# Patient Record
Sex: MALE | Race: WHITE | Employment: FULL TIME | URBAN - NONMETROPOLITAN AREA
[De-identification: names, ages, dates, MRNs, and addresses within clinical notes are randomized per-mention and may not be internally consistent; named-entity substitution may affect disease eponyms.]

---

## 2017-01-13 ENCOUNTER — HISTORY (OUTPATIENT)
Dept: FAMILY MEDICINE | Facility: OTHER | Age: 37
End: 2017-01-13

## 2017-01-13 ENCOUNTER — OFFICE VISIT - GICH (OUTPATIENT)
Dept: FAMILY MEDICINE | Facility: OTHER | Age: 37
End: 2017-01-13

## 2017-01-13 DIAGNOSIS — S61.219A LACERATION OF FINGER WITHOUT FOREIGN BODY WITHOUT DAMAGE TO NAIL: ICD-10-CM

## 2017-02-15 ENCOUNTER — HISTORY (OUTPATIENT)
Dept: FAMILY MEDICINE | Facility: OTHER | Age: 37
End: 2017-02-15

## 2017-02-15 ENCOUNTER — OFFICE VISIT - GICH (OUTPATIENT)
Dept: FAMILY MEDICINE | Facility: OTHER | Age: 37
End: 2017-02-15

## 2017-02-15 DIAGNOSIS — Z79.899 OTHER LONG TERM (CURRENT) DRUG THERAPY: ICD-10-CM

## 2017-02-15 DIAGNOSIS — F90.0 ATTENTION-DEFICIT HYPERACTIVITY DISORDER, PREDOMINANTLY INATTENTIVE TYPE: ICD-10-CM

## 2017-02-22 ENCOUNTER — COMMUNICATION - GICH (OUTPATIENT)
Dept: FAMILY MEDICINE | Facility: OTHER | Age: 37
End: 2017-02-22

## 2017-02-22 DIAGNOSIS — F90.0 ATTENTION-DEFICIT HYPERACTIVITY DISORDER, PREDOMINANTLY INATTENTIVE TYPE: ICD-10-CM

## 2017-02-22 DIAGNOSIS — Z79.899 OTHER LONG TERM (CURRENT) DRUG THERAPY: ICD-10-CM

## 2017-04-19 ENCOUNTER — OFFICE VISIT - GICH (OUTPATIENT)
Dept: FAMILY MEDICINE | Facility: OTHER | Age: 37
End: 2017-04-19

## 2017-04-19 ENCOUNTER — HISTORY (OUTPATIENT)
Dept: FAMILY MEDICINE | Facility: OTHER | Age: 37
End: 2017-04-19

## 2017-04-19 ENCOUNTER — COMMUNICATION - GICH (OUTPATIENT)
Dept: FAMILY MEDICINE | Facility: OTHER | Age: 37
End: 2017-04-19

## 2017-04-19 DIAGNOSIS — M77.12 LATERAL EPICONDYLITIS OF LEFT ELBOW: ICD-10-CM

## 2017-05-02 ENCOUNTER — COMMUNICATION - GICH (OUTPATIENT)
Dept: FAMILY MEDICINE | Facility: OTHER | Age: 37
End: 2017-05-02

## 2018-01-02 NOTE — NURSING NOTE
Patient Information     Patient Name MRN Fernando Servin [id number] Male 1980      Nursing Note by Lore Carreon at 2017 11:15 AM     Author:  Lore Carreon Service:  (none) Author Type:  (none)     Filed:  2017 11:12 AM Encounter Date:  2017 Status:  Signed     :  Lore Carreon            Patient presents to the clinic today for  Cut on left pointer finger that occurred 2017 at 4 am. Patient states cut occurred due to a orbital samantha.  Lore Carreon CMA ([facility])........2017 11:09 AM

## 2018-01-03 NOTE — PATIENT INSTRUCTIONS
Patient Information     Patient Name Fernando Bocanegra [id number] Male 1980      Patient Instructions by Sarai Solano NP at 2017 11:15 AM     Author:  Sarai Solano NP Service:  (none) Author Type:  PHYS- Nurse Practitioner     Filed:  2017 11:17 AM Encounter Date:  2017 Status:  Signed     :  Sarai Solano NP (PHYS- Nurse Practitioner)            Wash finger 1-2 times daily with soap and water.   Apply antibiotic ointment and bandage  Watch for signs of infection such as increased pain, redness or drainage-follow up if this occurs  Tetanus was given today

## 2018-01-03 NOTE — NURSING NOTE
Patient Information     Patient Name MRN Fernando Servin [id number] Male 1980      Nursing Note by Kateryna Long at 2/15/2017  3:00 PM     Author:  Kateryna Long Service:  (none) Author Type:  (none)     Filed:  2/15/2017  3:20 PM Encounter Date:  2/15/2017 Status:  Signed     :  Kateryna Long            Fernando Wong is a 36 y.o. male  presenting today for medication management  Kateryna Long LPN 2/15/2017 3:11 PM

## 2018-01-03 NOTE — TELEPHONE ENCOUNTER
Patient Information     Patient Name MRN Fernando Servin [id number] Male 1980      Telephone Encounter by Eze Lee MD at 2017  5:14 PM     Author:  Eze Lee MD Service:  (none) Author Type:  Physician     Filed:  2017  5:14 PM Encounter Date:  2017 Status:  Signed     :  Eze Lee MD (Physician)            OK changed

## 2018-01-03 NOTE — PROGRESS NOTES
Patient Information     Patient Name MRN Sex Fernando June [id number] Male 1980      Progress Notes by Sarai Solano NP at 2017 11:15 AM     Author:  Sarai Solano NP Service:  (none) Author Type:  PHYS- Nurse Practitioner     Filed:  2017 11:50 AM Encounter Date:  2017 Status:  Signed     :  Sarai Solano NP (PHYS- Nurse Practitioner)            HPI:    Fernando Wong is a 36 y.o. male who presents to clinic today for left index finger laceration. This occurred today at 4 am while using orbital samantha, working on a project at home. He did wash this and bandaged this up. He comes in now with concerns of this maybe having sawdust in wound. Co-workers recommended this be evaluated. Unsure when last had tetanus.     No past medical history on file.  Past Surgical History      Procedure  Laterality Date     [place] teeth extraction       Social History       Substance Use Topics         Smoking status:   Never Smoker     Smokeless tobacco:   Never Used     Alcohol use   Yes      Comment: rarely      Current Outpatient Prescriptions       Medication  Sig Dispense Refill     dextroamphetamine-amphetamine (ADDERALL XR) 10 mg Extended-Release capsule Take 3 capsules by mouth every morning 90 capsule 0     No current facility-administered medications for this visit.      Medications have been reviewed by me and are current to the best of my knowledge and ability.    No Known Allergies    ROS:  Pertinent positives and negatives are noted in HPI.    EXAM:  General appearance: well appearing male, in no acute distress  Dermatological: 1 cm laceration to tip of left index, wound is closed already.   Psychological: normal affect, alert and pleasant    ASSESSMENT/PLAN:    ICD-10-CM    1. Finger laceration, initial encounter S61.219A TDAP VACCINE IM   Finger laceration to left index finger. Discussed sx management with washing with soap and water, abx ointment and bandage 1-2 times daily.  Reviewed s/s infection and when to f/u in clinic. Tetanus was last given in 2008, this was updated today. All questions were answered and he is in agreement with plan.     Patient Instructions   Wash finger 1-2 times daily with soap and water.   Apply antibiotic ointment and bandage  Watch for signs of infection such as increased pain, redness or drainage-follow up if this occurs  Tetanus was given today

## 2018-01-03 NOTE — TELEPHONE ENCOUNTER
Patient Information     Patient Name MRFernando Son [id number] Male 1980      Telephone Encounter by Mariela Peterson at 2017  1:30 PM     Author:  Mariela Peterson Service:  (none) Author Type:  (none)     Filed:  2017  1:31 PM Encounter Date:  2017 Status:  Signed     :  Mariela Peterson            Patient notified and informed he needed to bring in the other 2 Rx that he received for the 20 mg med. He can pick these up when he returns the other 2.  Mariela Peterson LPN   2017  1:30 PM

## 2018-01-03 NOTE — TELEPHONE ENCOUNTER
Patient Information     Patient Name MRFernando Son [id number] Male 1980      Telephone Encounter by Kateryna Long at 2017  4:52 PM     Author:  Kateryna Long Service:  (none) Author Type:  (none)     Filed:  2017  5:03 PM Encounter Date:  2017 Status:  Signed     :  Kateryna Long            Patient states that he would like to just take 10 mg three times a day of the Adderall instead of the 20 mg and the 10 mg daily. He does not want to pay 2 copays. Pharmacy states that he would have to have new scripts for the 10 mg Three times daily.  Kateryna Long LPN 2017 4:53 PM

## 2018-01-03 NOTE — PROGRESS NOTES
Patient Information     Patient Name MRN Fernando Servin [id number] Male 1980      Progress Notes by Eze Lee MD at 2/15/2017  3:00 PM     Author:  Eze Lee MD Service:  (none) Author Type:  Physician     Filed:  2017 10:26 PM Encounter Date:  2/15/2017 Status:  Signed     :  Eze Lee MD (Physician)            SUBJECTIVE:  36 y.o. male presents for ADHD. Placed on Adderall in Nov, but never followed up. Had been on Adderall 30 mg daily through [facility] at [facility] in .  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    He was taking 20 mg daily, but then off since . Tolerated dose, no side effects. Some benefit, but thinks he would like to try 30 mg since it worked before.    REVIEW OF SYSTEMS:    Constitutional: Negative  Respiratory: Negative  Cardiovascular: Negative      Current Outpatient Prescriptions       Medication  Sig Dispense Refill     dextroamphetamine-amphetamine (ADDERALL XR) 10 mg Extended-Release capsule Take 3 capsules by mouth every morning 90 capsule 0     Allergies as of 02/15/2017      (No Known Allergies)       OBJECTIVE:  Visit Vitals       /80     Pulse (!) 48     Wt 102.5 kg (226 lb)     BMI 33.13 kg/m2     General Appearance: Alert. No acute distress  Psychiatric Exam: Alert and oriented, appropriate affect.    ASSESSMENT/PLAN:    ICD-10-CM   1. Attention deficit hyperactivity disorder (ADHD), predominantly inattentive type F90.0     Hx of ADHD proven with testing in grad school. Given Adderall XR 20 mg and 10 mg pills to take 20-30 mg daily. He avoid taking on weekends. Has 3 Rx of each, so has enough for 3 mo on 30 mg daily, more than that if he only needs 20 mg daily.     Completed controlled substance agreement. He is a low risk patient and is not on the Adderall currently, so no UDM obtained.   reviewed and only Rx in Nov from me.    F/U in 3 mo

## 2018-01-03 NOTE — TELEPHONE ENCOUNTER
Patient Information     Patient Name MRN Fernando Servin [id number] Male 1980      Telephone Encounter by Eze Lee MD at 3/7/2017  8:35 AM     Author:  Eze Lee MD Service:  (none) Author Type:  Physician     Filed:  3/7/2017  8:35 AM Encounter Date:  2017 Status:  Signed     :  Eze Lee MD (Physician)            I received all of the returned Rx.

## 2018-01-04 NOTE — TELEPHONE ENCOUNTER
Patient Information     Patient Name MRN Fernando Servin [id number] Male 1980      Telephone Encounter by Gosselin, Norma J at 2017 10:38 AM     Author:  Gosselin, Norma J Service:  (none) Author Type:  (none)     Filed:  2017 10:41 AM Encounter Date:  2017 Status:  Signed     :  Gosselin, Norma J            Faxed orders to [facility] [phone]  Norma J Gosselin LPN....................  2017   10:41 AM

## 2018-01-04 NOTE — PROGRESS NOTES
Patient Information     Patient Name MRN Sex Fernando June [id number] Male 1980      Progress Notes by Levon Francois MD at 2017 11:30 AM     Author:  Levon Francois MD Service:  (none) Author Type:  Physician     Filed:  2017  9:58 AM Encounter Date:  2017 Status:  Signed     :  Levon Francois MD (Physician)            SUBJECTIVE:    Fernando Wong is a 36 y.o. male who presents for elbow pain    HPI Comments: Patient arrives here for elbow pain. He's been lifting weights for the last 7 weeks. He has cut down but reports his left elbow has continued to give him pain and discomfort. No history of trauma. Patient has been using ibuprofen and Tylenol without any relief. Has also been icing it.      No Known Allergies,   Family History       Problem   Relation Age of Onset     Cancer  Maternal Grandfather 50     colon cancer; skin cancer      ,   Current Outpatient Prescriptions on File Prior to Visit       Medication  Sig Dispense Refill     [START ON 2017] dextroamphetamine-amphetamine (ADDERALL XR) 10 mg Extended-Release capsule Take 1 capsule by mouth once daily  Earliest Fill Date: 17 30 capsule 0     dextroamphetamine-amphetamine (ADDERALL XR) 10 mg Extended-Release capsule Take 1 capsule by mouth 3 times daily  Earliest Fill Date: 3/24/17 90 capsule 0     dextroamphetamine-amphetamine (ADDERALL XR) 10 mg Extended-Release capsule Take 1 capsule by mouth 3 times daily 90 capsule 0     No current facility-administered medications on file prior to visit.    ,   Past Surgical History:      Procedure  Laterality Date     [place] TEETH EXTRACTION     ,   Social History       Substance Use Topics         Smoking status:   Never Smoker     Smokeless tobacco:   Never Used     Alcohol use   Yes      Comment: rarely     and   Social History       Substance Use Topics         Smoking status:   Never Smoker     Smokeless tobacco:   Never Used     Alcohol use   Yes       Comment: rarely        REVIEW OF SYSTEMS:  ROS    OBJECTIVE:  /80  Pulse 56  Wt 98.9 kg (218 lb)  BMI 31.96 kg/m2    EXAM:   Physical Exam   Constitutional: He is well-developed, well-nourished, and in no distress.   Musculoskeletal:   Pain along the medial and lateral epicondyle good range of motion to his left elbow       ASSESSMENT/PLAN:    ICD-10-CM    1. Lateral epicondylitis of left elbow M77.12 AMB CONSULT TO PHYSICAL THERAPY        Plan:  Referral to physical therapy. Patient would like to see [facility] physical therapy.

## 2018-01-04 NOTE — TELEPHONE ENCOUNTER
Patient Information     Patient Name MRN Fernando Servin [id number] Male 1980      Telephone Encounter by Mariela Ji at 2017  1:08 PM     Author:  Mariela iJ Service:  (none) Author Type:  (none)     Filed:  2017  1:08 PM Encounter Date:  2017 Status:  Signed     :  Mariela Ji            Order for O.T is ready for signature  Mariela Ji LPN....................2017 1:08 PM

## 2018-01-04 NOTE — NURSING NOTE
Patient Information     Patient Name MRN Fernando Servin [id number] Male 1980      Nursing Note by Kateryna Banegas at 2017 11:30 AM     Author:  Kateryna Banegas Service:  (none) Author Type:  (none)     Filed:  2017 11:54 AM Encounter Date:  2017 Status:  Signed     :  Kateryna Banegas            Patient here for left elbow pain for the past 7 weeks, he hurt it lifting weights. Kateryna Banegas LPN .......................2017  11:51 AM

## 2018-01-19 PROBLEM — Z79.899 CONTROLLED SUBSTANCE AGREEMENT SIGNED: Status: ACTIVE | Noted: 2017-02-15

## 2018-01-19 PROBLEM — M77.12 LATERAL EPICONDYLITIS OF LEFT ELBOW: Status: ACTIVE | Noted: 2017-04-19

## 2018-01-19 RX ORDER — DEXTROAMPHETAMINE SACCHARATE, AMPHETAMINE ASPARTATE MONOHYDRATE, DEXTROAMPHETAMINE SULFATE AND AMPHETAMINE SULFATE 2.5; 2.5; 2.5; 2.5 MG/1; MG/1; MG/1; MG/1
10 CAPSULE, EXTENDED RELEASE ORAL DAILY
COMMUNITY
Start: 2017-04-23 | End: 2018-04-25

## 2018-01-25 VITALS
BODY MASS INDEX: 32.35 KG/M2 | WEIGHT: 218 LBS | DIASTOLIC BLOOD PRESSURE: 80 MMHG | BODY MASS INDEX: 31.21 KG/M2 | WEIGHT: 226 LBS | SYSTOLIC BLOOD PRESSURE: 118 MMHG | HEART RATE: 48 BPM | DIASTOLIC BLOOD PRESSURE: 80 MMHG | SYSTOLIC BLOOD PRESSURE: 132 MMHG | HEART RATE: 56 BPM

## 2018-01-26 VITALS
TEMPERATURE: 97.8 F | HEART RATE: 48 BPM | HEIGHT: 69 IN | SYSTOLIC BLOOD PRESSURE: 134 MMHG | BODY MASS INDEX: 31.28 KG/M2 | WEIGHT: 211.2 LBS | DIASTOLIC BLOOD PRESSURE: 70 MMHG

## 2018-03-27 ENCOUNTER — OFFICE VISIT (OUTPATIENT)
Dept: FAMILY MEDICINE | Facility: OTHER | Age: 38
End: 2018-03-27
Attending: NURSE PRACTITIONER
Payer: COMMERCIAL

## 2018-03-27 VITALS
WEIGHT: 219.25 LBS | TEMPERATURE: 97.1 F | HEART RATE: 62 BPM | DIASTOLIC BLOOD PRESSURE: 80 MMHG | SYSTOLIC BLOOD PRESSURE: 130 MMHG | BODY MASS INDEX: 32.14 KG/M2

## 2018-03-27 DIAGNOSIS — R07.0 THROAT PAIN: ICD-10-CM

## 2018-03-27 DIAGNOSIS — J02.9 VIRAL PHARYNGITIS: Primary | ICD-10-CM

## 2018-03-27 LAB
DEPRECATED S PYO AG THROAT QL EIA: NORMAL
SPECIMEN SOURCE: NORMAL

## 2018-03-27 PROCEDURE — 99213 OFFICE O/P EST LOW 20 MIN: CPT | Performed by: NURSE PRACTITIONER

## 2018-03-27 PROCEDURE — 87880 STREP A ASSAY W/OPTIC: CPT | Performed by: NURSE PRACTITIONER

## 2018-03-27 ASSESSMENT — PAIN SCALES - GENERAL: PAINLEVEL: MODERATE PAIN (4)

## 2018-03-27 NOTE — PROGRESS NOTES
HPI:    Fernando Wong is a 37 year old male who presents to clinic today for sore throat. Has had sore throat for 3-4 days. Has had pain on right side more than left. No fevers over past 2 days. He reports influenza like illness last week. Had body aches, chills. Had fevers up to 101.7. No coughing or sinus congestion. He has been taking mucinex and ibuprofen for sx. Does teach at a college.     No past medical history on file.    Past Surgical History:   Procedure Laterality Date     EXTRACTION(S) DENTAL      No Comments Provided       Current Outpatient Prescriptions   Medication Sig Dispense Refill     amphetamine-dextroamphetamine (ADDERALL XR) 10 MG per 24 hr capsule Take 10 mg by mouth daily         No Known Allergies    ROS:  Pertinent positives and negatives are noted in HPI.    EXAM:  General appearance: well appearing male, in no acute distress  Head: normocephalic, atraumatic  Ears: TM's with cone of light, no erythema, canals clear bilaterally  Eyes: conjunctivae normal  Orophayrnx: moist mucous membranes, tonsils with erythema, no exudates or petechiae, no post nasal drip seen. Hoarse voice  Neck: supple without adenopathy  Respiratory: clear to auscultation bilaterally  Cardiac: RRR with no murmurs  Psychological: normal affect, alert and pleasant  Lab:   Results for orders placed or performed in visit on 03/27/18   Strep, Rapid Screen   Result Value Ref Range    Specimen Description Throat     Rapid Strep A Screen       Negative presumptive for Group A Beta Streptococcus         ASSESSMENT AND PLAN:    1. Viral pharyngitis    2. Throat pain    RST negative. Symptoms likely due to virus. No antibiotic is needed at this time. Symptoms typically worse on days 3-4 and then begin improving each day. If symptoms begin worsening or fail to improve after 10-14 days, return to clinic for reevaluation. Discussed s/s that would warrant emergent f/u. All questions were answered and he is in agreement with plan.            Sarai Solano.Shante................3/27/2018 11:01 AM

## 2018-03-27 NOTE — NURSING NOTE
Patient presents to clinic today for sore throat starting this past Saturday. He states he thinks he recently had influenza.     Renetta Betts LPN...................3/27/2018  11:04 AM

## 2018-03-27 NOTE — MR AVS SNAPSHOT
After Visit Summary   3/27/2018    Fernando Wong    MRN: [id number]           Patient Information     Date Of Birth          1980        Visit Information        Provider Department      3/27/2018 11:00 AM Sarai Solano APRN CNP [facility]        Today's Diagnoses     Viral pharyngitis    -  1    Throat pain          Care Instructions      Self-Care for Sore Throats    Sore throats happen for many reasons, such as colds, allergies, and infections caused by viruses or bacteria. In any case, your throat becomes red and sore. Your goal for self-care is to reduce your discomfort while giving your throat a chance to heal.  Moisten and soothe your throat  Tips include the following:    Try a sip of water first thing after waking up.    Keep your throat moist by drinking 6 or more glasses of clear liquids every day.    Run a cool-air humidifier in your room overnight.    Avoid cigarette smoke.     Suck on throat lozenges, cough drops, hard candy, ice chips, or frozen fruit-juice bars. Use the sugar-free versions if your diet or medical condition requires them.  Gargle to ease irritation  Gargling every hour or 2 can ease irritation. Try gargling with 1 of these solutions:    1/4 teaspoon of salt in 1/2 cup of warm water    An over-the-counter anesthetic gargle  Use medicine for more relief  Over-the-counter medicine can reduce sore throat symptoms. Ask your pharmacist if you have questions about which medicine to use:    Ease pain with anesthetic sprays. Aspirin or an aspirin substitute also helps. Remember, never give aspirin to anyone 18 or younger, or if you are already taking blood thinners.     For sore throats caused by allergies, try antihistamines to block the allergic reaction.    Remember: unless a sore throat is caused by a bacterial infection, antibiotics won t help you.  Prevent future sore throats  Prevention tips include the following:    Stop smoking or reduce  contact with secondhand smoke. Smoke irritates the tender throat lining.    Limit contact with pets and with allergy-causing substances, such as pollen and mold.    When you re around someone with a sore throat or cold, wash your hands often to keep viruses or bacteria from spreading.    Don t strain your vocal cords.  Call your healthcare provider  Contact your healthcare provider if you have:    A temperature over 101 F (38.3 C)    White spots on the throat    Great difficulty swallowing    Trouble breathing    A skin rash    Recent exposure to someone else with strep bacteria    Severe hoarseness and swollen glands in the neck or jaw   Date Last Reviewed: 8/1/2016 2000-2017 [employer]. [address] [place] [address]. All rights reserved. This information is not intended as a substitute for professional medical care. Always follow your healthcare professional's instructions.                Follow-ups after your visit        Who to contact     If you have questions or need follow up information about today's clinic visit or your schedule please contact [facility] directly at [phone].  Normal or non-critical lab and imaging results will be communicated to you by [employer]art, letter or phone within 4 business days after the clinic has received the results. If you do not hear from us within 7 days, please contact the clinic through [employer] or phone. If you have a critical or abnormal lab result, we will notify you by phone as soon as possible.  Submit refill requests through [employer] or call your pharmacy and they will forward the refill request to us. Please allow 3 business days for your refill to be completed.          Additional Information About Your Visit        [employer] Information     [employer] lets you send messages to your doctor, view your test results, renew your prescriptions, schedule appointments and more. To sign up, go to [email or web address] . Click  "on \"Log in\" on the left side of the screen, which will take you to the Welcome page. Then click on \"Sign up Now\" on the right side of the page.     You will be asked to enter the access code listed below, as well as some personal information. Please follow the directions to create your username and password.     Your access code is: [email or web address]  Expires: 2018 11:36 AM     Your access code will  in 90 days. If you need help or a new code, please call your [facility] clinic or [phone].        Care EveryWhere ID     This is your Care EveryWhere ID. This could be used by other organizations to access your [facility] medical records  [id number]        Your Vitals Were     Pulse Temperature BMI (Body Mass Index)             62 97.1  F (36.2  C) (Temporal) 32.14 kg/m2          Blood Pressure from Last 3 Encounters:   18 130/80   17 132/80   02/15/17 118/80    Weight from Last 3 Encounters:   18 219 lb 4 oz (99.5 kg)   17 218 lb (98.9 kg)   02/15/17 226 lb (102.5 kg)              We Performed the Following     Strep, Rapid Screen        Primary Care Provider Fax #    Physician No Ref-Primary [phone]       No address on file        Equal Access to Services     GENNY ENGLISH : Hadii criss Bradley, waaxda luqadaha, qaybta kaalgordon sarabia, austyn bullard . So [facility] [phone].    ATENCIÓN: Si habla español, tiene a manzano disposición servicios gratuitos de asistencia lingüística. Llame al [phone].    We comply with applicable federal civil rights laws and [place] laws. We do not discriminate on the basis of race, color, national origin, age, disability, sex, sexual orientation, or gender identity.            Thank you!     Thank you for choosing [facility]  for your care. Our goal is always to provide you with excellent care. Hearing back from our patients is one way we can continue to improve our services. Please take a " few minutes to complete the written survey that you may receive in the mail after your visit with us. Thank you!             Your Updated Medication List - Protect others around you: Learn how to safely use, store and throw away your medicines at www.disposemymeds.org.          This list is accurate as of 3/27/18 11:36 AM.  Always use your most recent med list.                   Brand Name Dispense Instructions for use Diagnosis    amphetamine-dextroamphetamine 10 MG per 24 hr capsule    ADDERALL XR     Take 10 mg by mouth daily

## 2018-03-27 NOTE — PATIENT INSTRUCTIONS
Self-Care for Sore Throats    Sore throats happen for many reasons, such as colds, allergies, and infections caused by viruses or bacteria. In any case, your throat becomes red and sore. Your goal for self-care is to reduce your discomfort while giving your throat a chance to heal.  Moisten and soothe your throat  Tips include the following:    Try a sip of water first thing after waking up.    Keep your throat moist by drinking 6 or more glasses of clear liquids every day.    Run a cool-air humidifier in your room overnight.    Avoid cigarette smoke.     Suck on throat lozenges, cough drops, hard candy, ice chips, or frozen fruit-juice bars. Use the sugar-free versions if your diet or medical condition requires them.  Gargle to ease irritation  Gargling every hour or 2 can ease irritation. Try gargling with 1 of these solutions:    1/4 teaspoon of salt in 1/2 cup of warm water    An over-the-counter anesthetic gargle  Use medicine for more relief  Over-the-counter medicine can reduce sore throat symptoms. Ask your pharmacist if you have questions about which medicine to use:    Ease pain with anesthetic sprays. Aspirin or an aspirin substitute also helps. Remember, never give aspirin to anyone 18 or younger, or if you are already taking blood thinners.     For sore throats caused by allergies, try antihistamines to block the allergic reaction.    Remember: unless a sore throat is caused by a bacterial infection, antibiotics won t help you.  Prevent future sore throats  Prevention tips include the following:    Stop smoking or reduce contact with secondhand smoke. Smoke irritates the tender throat lining.    Limit contact with pets and with allergy-causing substances, such as pollen and mold.    When you re around someone with a sore throat or cold, wash your hands often to keep viruses or bacteria from spreading.    Don t strain your vocal cords.  Call your healthcare provider  Contact your healthcare provider if  you have:    A temperature over 101 F (38.3 C)    White spots on the throat    Great difficulty swallowing    Trouble breathing    A skin rash    Recent exposure to someone else with strep bacteria    Severe hoarseness and swollen glands in the neck or jaw   Date Last Reviewed: 8/1/2016 2000-2017 [employer]. [address] [place], [place] [address]. All rights reserved. This information is not intended as a substitute for professional medical care. Always follow your healthcare professional's instructions.

## 2018-04-25 ENCOUNTER — OFFICE VISIT (OUTPATIENT)
Dept: FAMILY MEDICINE | Facility: OTHER | Age: 38
End: 2018-04-25
Attending: FAMILY MEDICINE
Payer: COMMERCIAL

## 2018-04-25 VITALS
TEMPERATURE: 98.4 F | WEIGHT: 215.6 LBS | SYSTOLIC BLOOD PRESSURE: 138 MMHG | BODY MASS INDEX: 31.61 KG/M2 | DIASTOLIC BLOOD PRESSURE: 86 MMHG

## 2018-04-25 DIAGNOSIS — J20.9 ACUTE BRONCHITIS WITH SYMPTOMS > 10 DAYS: Primary | ICD-10-CM

## 2018-04-25 PROCEDURE — 99213 OFFICE O/P EST LOW 20 MIN: CPT | Performed by: FAMILY MEDICINE

## 2018-04-25 RX ORDER — AZITHROMYCIN 250 MG/1
TABLET, FILM COATED ORAL
Qty: 6 TABLET | Refills: 0 | Status: SHIPPED | OUTPATIENT
Start: 2018-04-25 | End: 2018-08-10

## 2018-04-25 ASSESSMENT — PAIN SCALES - GENERAL: PAINLEVEL: MILD PAIN (2)

## 2018-04-25 NOTE — PROGRESS NOTES
Nursing Notes:   Katherine Funez CMA  4/25/2018  4:52 PM  Signed  Patient presents for an ongoing cough (non-productive now) and head congestion over the last 7 weeks.  Other symptoms have come and gone including fever, sore throat, productive cough, and body aches.  Ears have felt really plugged over the last couple of days.  OTC cough medications are not helping much, especially with his cough at night.  Pain behind right eye with coughing now.    Katherine Funez [facility] ([facility])................ 4/25/2018 3:52 PM         Subjective:  Fernando Wong is a 37 year old male who presents for URI    She is a 37-year-old white male who comes in with a 7 week history of cough.  Initially nonproductive now becoming more productive.  He states a week ago he started having fevers and thicker nasal discharge.  He has had increasing headache.  Some body aches.  Temp is never been above 100.  He was screened for strep throat a month ago which was negative.  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  He denies any nausea vomiting or diarrhea.  No chest pain no shortness of breath.  He is a non-smoker he did not receive the flu shot this year    He has been using a variety of over-the-counter products including but not limited to Afrin, NyQuil, Benadryl, cough syrup.      No Known Allergies     Current Outpatient Prescriptions   Medication     azithromycin (ZITHROMAX) 250 MG tablet     No current facility-administered medications for this visit.      Patient Active Problem List   Diagnosis     Controlled substance agreement signed     Insomnia     Lateral epicondylitis of left elbow     Recurrent herpes simplex         Social Hx:  Social History   Substance Use Topics     Smoking status: Never Smoker     Smokeless tobacco: Never Used     Alcohol use Yes      Comment: Less than 1 per month       Family Hx:   Family History   Problem Relation Age of Onset      CANCER Maternal Grandfather 50     Cancer,colon cancer; skin cancer       Objective:  /86  Temp 98.4  F (36.9  C) (Tympanic)  Wt 215 lb 9.6 oz (97.8 kg)  BMI 31.61 kg/m2    Patient is alert oriented ×3 no apparent distress PERRLA EOMI is tender with palpation of his right maxillary sinus but not left nor his frontal.  His Postnasal drip thick posterior pharynx.  TM retracted on right otherwise normal left TM is normal.  Lungs clear to auscultation without wheezes rhonchi rales heart sounds S1-S2 in regular rate rhythm abdomen soft nontender skin is warm without rash      Assessment:    ICD-10-CM    1. Acute bronchitis with symptoms > 10 days J20.9 azithromycin (ZITHROMAX) 250 MG tablet         Plan:   -- Expected clinical course discussed   -- Medications and their side effects discussed  Patient Instructions     1.  zithromax is your antibiotic; take as directed  2.  Stop nyquil, stop sudafed it is increasing your blood pressure  3.  Increase fluids , water/ soup/ broth/ gatorade/ milk/ juice  4.  Okay to use cough drops or delsym over the counter as cough syrup if necessary   5.  Have your blood pressure rechecked in 2-3 weeks  6.  Never use afrin nasal spray longer than 3 days     Bronchitis, Antibiotic Treatment (Adult)    Bronchitis is an infection of the air passages (bronchial tubes) in your lungs. It often occurs when you have a cold. This illness is contagious during the first few days and is spread through the air by coughing and sneezing, or by direct contact (touching the sick person and then touching your own eyes, nose, or mouth).  Symptoms of bronchitis include cough with mucus (phlegm) and low-grade fever. Bronchitis usually lasts 7 to 14 days. Mild cases can be treated with simple home remedies. More severe infection is treated with an antibiotic.  Home care  Follow these guidelines when caring for yourself at home:    If your symptoms are severe, rest at home for the first 2 to 3 days.  When you go back to your usual activities, don't let yourself get too tired.    Do not smoke. Also avoid being exposed to secondhand smoke.    You may use over-the-counter medicines to control fever or pain, unless another medicine was prescribed. If you have chronic liver or kidney disease or have ever had a stomach ulcer or gastrointestinal bleeding, talk with your healthcare provider before using these medicines. Also talk to your provider if you are taking medicine to prevent blood clots. Aspirin should never be given to anyone younger than 18 years of age who is ill with a viral infection or fever. It may cause severe liver or brain damage.    Your appetite may be poor, so a light diet is fine. Avoid dehydration by drinking 6 to 8 glasses of fluids per day (such as water, soft drinks, sports drinks, juices, tea, or soup). Extra fluids will help loosen secretions in the nose and lungs.    Over-the-counter cough, cold, and sore-throat medicines will not shorten the length of the illness, but they may be helpful to reduce symptoms. (Note: Do not use decongestants if you have high blood pressure.)    Finish all antibiotic medicine. Do this even if you are feeling better after only a few days.  Follow-up care  Follow up with your healthcare provider, or as advised. If you had an X-ray or ECG (electrocardiogram), a specialist will review it. You will be notified of any new findings that may affect your care.  If you are age 65 or older, or if you have a chronic lung disease or condition that affects your immune system, or you smoke, ask your healthcare provider about getting a pneumococcal vaccine and a yearly flu shot (influenza vaccine).  When to seek medical advice  Call your healthcare provider right away if any of these occur:    Fever of 100.4 F (38 C) or higher, or as directed by your healthcare provider    Coughing up increased amounts of colored sputum    Weakness, drowsiness, headache, facial pain, ear  pain, or a stiff neck  Call 911  Call 911 if any of these occur.    Coughing up blood    Worsening weakness, drowsiness, headache, or stiff neck    Trouble breathing, wheezing, or pain with breathing  Date Last Reviewed: 9/13/2015 2000-2017 [employer]. [address] [place] [id number]. All rights reserved. This information is not intended as a substitute for professional medical care. Always follow your healthcare professional's instructions.        Sinusitis (No Antibiotics)    The sinuses are air-filled spaces within the bones of the face. They connect to the inside of the nose. Sinusitis is an inflammation of the tissue that lines the sinuses. Sinusitis can occur during a cold. It can also happen due to allergies to pollens and other particles in the air. It can cause symptoms such as sinus congestion, headache, sore throat, facial swelling, and a feeling of fullness. It may also cause a low-grade fever. Your sinusitis does not include an infection with bacteria. Because of this, antibiotics are not used to treat this problem.  Home care    Drink plenty of water, hot tea, and other liquids. This may help thin nasal mucus. It also may help your sinuses drain fluids.    Heat may help soothe painful areas of your face. Use a towel soaked in hot water. Or,  the shower and direct the warm spray onto your face. Using a vaporizer along with a menthol rub at night may also help soothe symptoms.     An expectorant with guaifenesin may help thin nasal mucus and help your sinuses drain fluids.    You can use an over-the-counter decongestant, unless a similar medicine was prescribed to you. Nasal sprays work the fastest. Use one that contains phenylephrine or oxymetazoline. First blow your nose gently. Then use the spray. Do not use these medicines more often than directed on the label. If you do, your symptoms may get worse. You may also take pills that contain pseudoephedrine. Don t use  products that combine multiple medicines. This is because side effects may be increased. Read all medicine labels. You can also ask the pharmacist for help. (People with high blood pressure should not use decongestants. They can raise blood pressure.)    Over-the-counter antihistamines may help if allergies contributed to your sinusitis.      Use acetaminophen or ibuprofen to control pain, unless another pain medicine was prescribed to you. If you have chronic liver or kidney disease or ever had a stomach ulcer, talk with your healthcare provider before using these medicines. (Aspirin should never be taken by anyone under age 18 who is ill with a fever. It may cause severe liver damage.)    Use nasal rinses or irrigation as instructed by your healthcare provider.    Don't smoke. This can make symptoms worse.  Follow-up care  Follow up with your healthcare provider or our staff if you are better in 1 week.  When to seek medical advice  Call your healthcare provider if any of these occur:    Green or yellow fluid draining from your nose or into your throat    Facial pain or headache that gets worse    Stiff neck    Unusual drowsiness or confusion    Swelling of your forehead or eyelids    Vision problems, such as blurred or double vision    Fever of 100.4 F (38 C) or higher, or as directed by your healthcare provider    Seizure    Breathing problems    Symptoms that don't go away in 10 days  Date Last Reviewed: 11/1/2017 2000-2017 [employer] [address] [place] [address], [place] [address]. All rights reserved. This information is not intended as a substitute for professional medical care. Always follow your healthcare professional's instructions.            KATHRYN LIU MD

## 2018-04-25 NOTE — NURSING NOTE
Patient presents for an ongoing cough (non-productive now) and head congestion over the last 7 weeks.  Other symptoms have come and gone including fever, sore throat, productive cough, and body aches.  Ears have felt really plugged over the last couple of days.  OTC cough medications are not helping much, especially with his cough at night.  Pain behind right eye with coughing now.    Katherine Funez CMA ([facility])................ 4/25/2018 3:52 PM

## 2018-04-25 NOTE — PATIENT INSTRUCTIONS
1.  zithromax is your antibiotic; take as directed  2.  Stop nyquil, stop sudafed it is increasing your blood pressure  3.  Increase fluids , water/ soup/ broth/ gatorade/ milk/ juice  4.  Okay to use cough drops or delsym over the counter as cough syrup if necessary   5.  Have your blood pressure rechecked in 2-3 weeks  6.  Never use afrin nasal spray longer than 3 days     Bronchitis, Antibiotic Treatment (Adult)    Bronchitis is an infection of the air passages (bronchial tubes) in your lungs. It often occurs when you have a cold. This illness is contagious during the first few days and is spread through the air by coughing and sneezing, or by direct contact (touching the sick person and then touching your own eyes, nose, or mouth).  Symptoms of bronchitis include cough with mucus (phlegm) and low-grade fever. Bronchitis usually lasts 7 to 14 days. Mild cases can be treated with simple home remedies. More severe infection is treated with an antibiotic.  Home care  Follow these guidelines when caring for yourself at home:    If your symptoms are severe, rest at home for the first 2 to 3 days. When you go back to your usual activities, don't let yourself get too tired.    Do not smoke. Also avoid being exposed to secondhand smoke.    You may use over-the-counter medicines to control fever or pain, unless another medicine was prescribed. If you have chronic liver or kidney disease or have ever had a stomach ulcer or gastrointestinal bleeding, talk with your healthcare provider before using these medicines. Also talk to your provider if you are taking medicine to prevent blood clots. Aspirin should never be given to anyone younger than 18 years of age who is ill with a viral infection or fever. It may cause severe liver or brain damage.    Your appetite may be poor, so a light diet is fine. Avoid dehydration by drinking 6 to 8 glasses of fluids per day (such as water, soft drinks, sports drinks, juices, tea, or  soup). Extra fluids will help loosen secretions in the nose and lungs.    Over-the-counter cough, cold, and sore-throat medicines will not shorten the length of the illness, but they may be helpful to reduce symptoms. (Note: Do not use decongestants if you have high blood pressure.)    Finish all antibiotic medicine. Do this even if you are feeling better after only a few days.  Follow-up care  Follow up with your healthcare provider, or as advised. If you had an X-ray or ECG (electrocardiogram), a specialist will review it. You will be notified of any new findings that may affect your care.  If you are age 65 or older, or if you have a chronic lung disease or condition that affects your immune system, or you smoke, ask your healthcare provider about getting a pneumococcal vaccine and a yearly flu shot (influenza vaccine).  When to seek medical advice  Call your healthcare provider right away if any of these occur:    Fever of 100.4 F (38 C) or higher, or as directed by your healthcare provider    Coughing up increased amounts of colored sputum    Weakness, drowsiness, headache, facial pain, ear pain, or a stiff neck  Call 911  Call 911 if any of these occur.    Coughing up blood    Worsening weakness, drowsiness, headache, or stiff neck    Trouble breathing, wheezing, or pain with breathing  Date Last Reviewed: 9/13/2015 2000-2017 [employer]. [address] [place], [place] [id number]. All rights reserved. This information is not intended as a substitute for professional medical care. Always follow your healthcare professional's instructions.        Sinusitis (No Antibiotics)    The sinuses are air-filled spaces within the bones of the face. They connect to the inside of the nose. Sinusitis is an inflammation of the tissue that lines the sinuses. Sinusitis can occur during a cold. It can also happen due to allergies to pollens and other particles in the air. It can cause symptoms such as sinus  congestion, headache, sore throat, facial swelling, and a feeling of fullness. It may also cause a low-grade fever. Your sinusitis does not include an infection with bacteria. Because of this, antibiotics are not used to treat this problem.  Home care    Drink plenty of water, hot tea, and other liquids. This may help thin nasal mucus. It also may help your sinuses drain fluids.    Heat may help soothe painful areas of your face. Use a towel soaked in hot water. Or,  the shower and direct the warm spray onto your face. Using a vaporizer along with a menthol rub at night may also help soothe symptoms.     An expectorant with guaifenesin may help thin nasal mucus and help your sinuses drain fluids.    You can use an over-the-counter decongestant, unless a similar medicine was prescribed to you. Nasal sprays work the fastest. Use one that contains phenylephrine or oxymetazoline. First blow your nose gently. Then use the spray. Do not use these medicines more often than directed on the label. If you do, your symptoms may get worse. You may also take pills that contain pseudoephedrine. Don t use products that combine multiple medicines. This is because side effects may be increased. Read all medicine labels. You can also ask the pharmacist for help. (People with high blood pressure should not use decongestants. They can raise blood pressure.)    Over-the-counter antihistamines may help if allergies contributed to your sinusitis.      Use acetaminophen or ibuprofen to control pain, unless another pain medicine was prescribed to you. If you have chronic liver or kidney disease or ever had a stomach ulcer, talk with your healthcare provider before using these medicines. (Aspirin should never be taken by anyone under age 18 who is ill with a fever. It may cause severe liver damage.)    Use nasal rinses or irrigation as instructed by your healthcare provider.    Don't smoke. This can make symptoms worse.  Follow-up  care  Follow up with your healthcare provider or our staff if you are better in 1 week.  When to seek medical advice  Call your healthcare provider if any of these occur:    Green or yellow fluid draining from your nose or into your throat    Facial pain or headache that gets worse    Stiff neck    Unusual drowsiness or confusion    Swelling of your forehead or eyelids    Vision problems, such as blurred or double vision    Fever of 100.4 F (38 C) or higher, or as directed by your healthcare provider    Seizure    Breathing problems    Symptoms that don't go away in 10 days  Date Last Reviewed: 11/1/2017 2000-2017 [employer]. [address] [place] [address]. All rights reserved. This information is not intended as a substitute for professional medical care. Always follow your healthcare professional's instructions.

## 2018-04-25 NOTE — MR AVS SNAPSHOT
After Visit Summary   4/25/2018    Fernando Wong    MRN: [id number]           Patient Information     Date Of Birth          1980        Visit Information        Provider Department      4/25/2018 3:30 PM Naila Reich MD [facility]        Today's Diagnoses     Acute bronchitis with symptoms > 10 days    -  1      Care Instructions      1.  zithromax is your antibiotic; take as directed  2.  Stop nyquil, stop sudafed it is increasing your blood pressure  3.  Increase fluids , water/ soup/ broth/ gatorade/ milk/ juice  4.  Okay to use cough drops or delsym over the counter as cough syrup if necessary   5.  Have your blood pressure rechecked in 2-3 weeks  6.  Never use afrin nasal spray longer than 3 days     Bronchitis, Antibiotic Treatment (Adult)    Bronchitis is an infection of the air passages (bronchial tubes) in your lungs. It often occurs when you have a cold. This illness is contagious during the first few days and is spread through the air by coughing and sneezing, or by direct contact (touching the sick person and then touching your own eyes, nose, or mouth).  Symptoms of bronchitis include cough with mucus (phlegm) and low-grade fever. Bronchitis usually lasts 7 to 14 days. Mild cases can be treated with simple home remedies. More severe infection is treated with an antibiotic.  Home care  Follow these guidelines when caring for yourself at home:    If your symptoms are severe, rest at home for the first 2 to 3 days. When you go back to your usual activities, don't let yourself get too tired.    Do not smoke. Also avoid being exposed to secondhand smoke.    You may use over-the-counter medicines to control fever or pain, unless another medicine was prescribed. If you have chronic liver or kidney disease or have ever had a stomach ulcer or gastrointestinal bleeding, talk with your healthcare provider before using these medicines. Also talk to your provider if  you are taking medicine to prevent blood clots. Aspirin should never be given to anyone younger than 18 years of age who is ill with a viral infection or fever. It may cause severe liver or brain damage.    Your appetite may be poor, so a light diet is fine. Avoid dehydration by drinking 6 to 8 glasses of fluids per day (such as water, soft drinks, sports drinks, juices, tea, or soup). Extra fluids will help loosen secretions in the nose and lungs.    Over-the-counter cough, cold, and sore-throat medicines will not shorten the length of the illness, but they may be helpful to reduce symptoms. (Note: Do not use decongestants if you have high blood pressure.)    Finish all antibiotic medicine. Do this even if you are feeling better after only a few days.  Follow-up care  Follow up with your healthcare provider, or as advised. If you had an X-ray or ECG (electrocardiogram), a specialist will review it. You will be notified of any new findings that may affect your care.  If you are age 65 or older, or if you have a chronic lung disease or condition that affects your immune system, or you smoke, ask your healthcare provider about getting a pneumococcal vaccine and a yearly flu shot (influenza vaccine).  When to seek medical advice  Call your healthcare provider right away if any of these occur:    Fever of 100.4 F (38 C) or higher, or as directed by your healthcare provider    Coughing up increased amounts of colored sputum    Weakness, drowsiness, headache, facial pain, ear pain, or a stiff neck  Call 911  Call 911 if any of these occur.    Coughing up blood    Worsening weakness, drowsiness, headache, or stiff neck    Trouble breathing, wheezing, or pain with breathing  Date Last Reviewed: 9/13/2015 2000-2017 [employer]. [address] [place] [address], [place] [address]. All rights reserved. This information is not intended as a substitute for professional medical care. Always follow your healthcare  professional's instructions.        Sinusitis (No Antibiotics)    The sinuses are air-filled spaces within the bones of the face. They connect to the inside of the nose. Sinusitis is an inflammation of the tissue that lines the sinuses. Sinusitis can occur during a cold. It can also happen due to allergies to pollens and other particles in the air. It can cause symptoms such as sinus congestion, headache, sore throat, facial swelling, and a feeling of fullness. It may also cause a low-grade fever. Your sinusitis does not include an infection with bacteria. Because of this, antibiotics are not used to treat this problem.  Home care    Drink plenty of water, hot tea, and other liquids. This may help thin nasal mucus. It also may help your sinuses drain fluids.    Heat may help soothe painful areas of your face. Use a towel soaked in hot water. Or,  the shower and direct the warm spray onto your face. Using a vaporizer along with a menthol rub at night may also help soothe symptoms.     An expectorant with guaifenesin may help thin nasal mucus and help your sinuses drain fluids.    You can use an over-the-counter decongestant, unless a similar medicine was prescribed to you. Nasal sprays work the fastest. Use one that contains phenylephrine or oxymetazoline. First blow your nose gently. Then use the spray. Do not use these medicines more often than directed on the label. If you do, your symptoms may get worse. You may also take pills that contain pseudoephedrine. Don t use products that combine multiple medicines. This is because side effects may be increased. Read all medicine labels. You can also ask the pharmacist for help. (People with high blood pressure should not use decongestants. They can raise blood pressure.)    Over-the-counter antihistamines may help if allergies contributed to your sinusitis.      Use acetaminophen or ibuprofen to control pain, unless another pain medicine was prescribed to you.  If you have chronic liver or kidney disease or ever had a stomach ulcer, talk with your healthcare provider before using these medicines. (Aspirin should never be taken by anyone under age 18 who is ill with a fever. It may cause severe liver damage.)    Use nasal rinses or irrigation as instructed by your healthcare provider.    Don't smoke. This can make symptoms worse.  Follow-up care  Follow up with your healthcare provider or our staff if you are better in 1 week.  When to seek medical advice  Call your healthcare provider if any of these occur:    Green or yellow fluid draining from your nose or into your throat    Facial pain or headache that gets worse    Stiff neck    Unusual drowsiness or confusion    Swelling of your forehead or eyelids    Vision problems, such as blurred or double vision    Fever of 100.4 F (38 C) or higher, or as directed by your healthcare provider    Seizure    Breathing problems    Symptoms that don't go away in 10 days  Date Last Reviewed: 11/1/2017 2000-2017 [employer]. [address] [place] [address]. All rights reserved. This information is not intended as a substitute for professional medical care. Always follow your healthcare professional's instructions.                Follow-ups after your visit        Who to contact     If you have questions or need follow up information about today's clinic visit or your schedule please contact [facility] directly at [phone].  Normal or non-critical lab and imaging results will be communicated to you by MyChart, letter or phone within 4 business days after the clinic has received the results. If you do not hear from us within 7 days, please contact the clinic through MyChart or phone. If you have a critical or abnormal lab result, we will notify you by phone as soon as possible.  Submit refill requests through [employer] or call your pharmacy and they will forward the refill request to us.  "Please allow 3 business days for your refill to be completed.          Additional Information About Your Visit        [employer] Information     [employer] lets you send messages to your doctor, view your test results, renew your prescriptions, schedule appointments and more. To sign up, go to [email or web address] . Click on \"Log in\" on the left side of the screen, which will take you to the Welcome page. Then click on \"Sign up Now\" on the right side of the page.     You will be asked to enter the access code listed below, as well as some personal information. Please follow the directions to create your username and password.     Your access code is: [email or web address]  Expires: 2018 11:36 AM     Your access code will  in 90 days. If you need help or a new code, please call your [facility] clinic or [phone].        Care EveryWhere ID     This is your Care EveryWhere ID. This could be used by other organizations to access your [facility] medical records  [id number]        Your Vitals Were     Temperature BMI (Body Mass Index)                98.4  F (36.9  C) (Tympanic) 31.61 kg/m2           Blood Pressure from Last 3 Encounters:   18 138/86   18 130/80   17 132/80    Weight from Last 3 Encounters:   18 215 lb 9.6 oz (97.8 kg)   18 219 lb 4 oz (99.5 kg)   17 218 lb (98.9 kg)              Today, you had the following     No orders found for display         Today's Medication Changes          These changes are accurate as of 18  4:55 PM.  If you have any questions, ask your nurse or doctor.               Start taking these medicines.        Dose/Directions    azithromycin 250 MG tablet   Commonly known as:  ZITHROMAX   Used for:  Acute bronchitis with symptoms > 10 days   Started by:  Naila Reich MD        Two tablets first day, then one tablet daily for four days.   Quantity:  6 tablet   Refills:  0            Where to get your medicines      These medications were " sent to [id number] [facility] - [place], [place] - [address]  [address] [place] [address]     Phone:  [phone]     azithromycin 250 MG tablet                Primary Care Provider Fax #    Physician No Ref-Primary [phone]       No address on file        Equal Access to Services     [facility] : Hadii aad ku hadasho Soomaali, waaxda luqadaha, qaybta kaalmada adeegyada, waxlorna verain hayaan adericardo queenugorajiv bullard . So [facility] [phone].    ATENCIÓN: Si habla español, tiene a manzano disposición servicios gratuitos de asistencia lingüística. Llame al [phone].    We comply with applicable federal civil rights laws and [place] laws. We do not discriminate on the basis of race, color, national origin, age, disability, sex, sexual orientation, or gender identity.            Thank you!     Thank you for choosing [facility] AND [facility]  for your care. Our goal is always to provide you with excellent care. Hearing back from our patients is one way we can continue to improve our services. Please take a few minutes to complete the written survey that you may receive in the mail after your visit with us. Thank you!             Your Updated Medication List - Protect others around you: Learn how to safely use, store and throw away your medicines at www.disposemymeds.org.          This list is accurate as of 4/25/18  4:55 PM.  Always use your most recent med list.                   Brand Name Dispense Instructions for use Diagnosis    azithromycin 250 MG tablet    ZITHROMAX    6 tablet    Two tablets first day, then one tablet daily for four days.    Acute bronchitis with symptoms > 10 days

## 2018-08-10 ENCOUNTER — OFFICE VISIT (OUTPATIENT)
Dept: FAMILY MEDICINE | Facility: OTHER | Age: 38
End: 2018-08-10
Attending: FAMILY MEDICINE
Payer: COMMERCIAL

## 2018-08-10 VITALS
SYSTOLIC BLOOD PRESSURE: 130 MMHG | BODY MASS INDEX: 31.14 KG/M2 | WEIGHT: 212.4 LBS | DIASTOLIC BLOOD PRESSURE: 82 MMHG | HEART RATE: 48 BPM

## 2018-08-10 DIAGNOSIS — K59.01 SLOW TRANSIT CONSTIPATION: ICD-10-CM

## 2018-08-10 DIAGNOSIS — K42.9 UMBILICAL HERNIA WITHOUT OBSTRUCTION AND WITHOUT GANGRENE: Primary | ICD-10-CM

## 2018-08-10 PROCEDURE — 99213 OFFICE O/P EST LOW 20 MIN: CPT | Performed by: FAMILY MEDICINE

## 2018-08-10 RX ORDER — POLYETHYLENE GLYCOL 3350 17 G/17G
1 POWDER, FOR SOLUTION ORAL DAILY
Qty: 510 G | Refills: 2 | Status: SHIPPED | OUTPATIENT
Start: 2018-08-10 | End: 2019-01-15

## 2018-08-10 ASSESSMENT — ENCOUNTER SYMPTOMS
DIARRHEA: 0
DIAPHORESIS: 0
CHILLS: 0
VOMITING: 0
FEVER: 0
DYSURIA: 0
HEMATOCHEZIA: 0
DIFFICULTY URINATING: 0
RECTAL PAIN: 0
FATIGUE: 0
UNEXPECTED WEIGHT CHANGE: 0

## 2018-08-10 NOTE — NURSING NOTE
"Chief Complaint   Patient presents with     Abdominal Pain       Initial /82 (BP Location: Right arm, Patient Position: Sitting, Cuff Size: Adult Large)  Pulse (!) 48  Wt 212 lb 6.4 oz (96.3 kg)  BMI 31.14 kg/m2 Estimated body mass index is 31.14 kg/(m^2) as calculated from the following:    Height as of 1/13/17: 5' 9.25\" (1.759 m).    Weight as of this encounter: 212 lb 6.4 oz (96.3 kg).  Medication Reconciliation: complete    Lore Dash LPN  "

## 2018-08-10 NOTE — PROGRESS NOTES
SUBJECTIVE:   Fernando Wong is a 37 year old male who presents to clinic today for the following health issues:    HPI  Fernando is here for concerns of abdominal pain.  Just L of center and at the top of umbilicus.  Comes and goes since this spring when he had bronchitis; did a lot of coughing at that time.  Seems to be getting gradually worse.  Not associated with other movements/motions/lifting.  Some constipation intermittently; uncertain if it is related to when his abdomen is more bothersome.      Patient Active Problem List    Diagnosis Date Noted     Lateral epicondylitis of left elbow 04/19/2017     Priority: Medium     Controlled substance agreement signed 02/15/2017     Priority: Medium     Recurrent herpes simplex 11/01/2016     Priority: Medium     Insomnia 12/31/2015     Priority: Medium     History reviewed. No pertinent past medical history.   Past Surgical History:   Procedure Laterality Date     EXTRACTION(S) DENTAL      No Comments Provided     Family History   Problem Relation Age of Onset     Cancer Maternal Grandfather 50     Cancer,colon cancer; skin cancer     Social History   Substance Use Topics     Smoking status: Never Smoker     Smokeless tobacco: Never Used     Alcohol use Yes      Comment: Less than 1 per month     Social History     Social History Narrative     Single, works at [employer] in [employer];  No children.  Non smoker.     No current outpatient prescriptions on file.     No Known Allergies    Review of Systems   Constitutional: Negative for chills, diaphoresis, fatigue, fever and unexpected weight change.   Gastrointestinal: Negative for diarrhea, hematochezia, rectal pain and vomiting.   Genitourinary: Negative for difficulty urinating and dysuria.        OBJECTIVE:     /82 (BP Location: Right arm, Patient Position: Sitting, Cuff Size: Adult Large)  Pulse (!) 48  Wt 212 lb 6.4 oz (96.3 kg)  BMI 31.14 kg/m2  Body mass index is 31.14 kg/(m^2).  Physical Exam   Constitutional: He  appears well-developed and well-nourished.   HENT:   Head: Normocephalic and atraumatic.   Cardiovascular: Normal rate and normal heart sounds.    Pulmonary/Chest: Effort normal.   Abdominal: Soft. He exhibits no distension. There is no hepatosplenomegaly. There is tenderness in the periumbilical area. There is no rebound and no CVA tenderness.       Dinuba sized umbilical defect.   Nursing note and vitals reviewed.      Diagnostic Test Results:  CT abd/pelvis scheduled.    ASSESSMENT/PLAN:     1. Umbilical hernia without obstruction and without gangrene  CT abd/pelvis scheduled to confirm size/location and adjacent organs.  Follow up with General Surgery.  Encouraged increased fluids and miralax prn to help keep soft stools.  Increased abdominal pressure/constipation could be a trigger.  Patient aware of s/s for worsening/emergent need for repair - and reassuring he hasn't had anything of that nature to this point.  - polyethylene glycol (MIRALAX) powder; Take 17 g (1 capful) by mouth daily  Dispense: 510 g; Refill: 2  - CT Abdomen Pelvis w/o Contrast; Future  - GENERAL SURG ADULT REFERRAL    2. Slow transit constipation  - polyethylene glycol (MIRALAX) powder; Take 17 g (1 capful) by mouth daily  Dispense: 510 g; Refill: 2      Shauna Spears DO  [facility] \[facility]\""

## 2018-08-10 NOTE — MR AVS SNAPSHOT
"              After Visit Summary   8/10/2018    Fernando Wong    MRN: [id number]           Patient Information     Date Of Birth          1980        Visit Information        Provider Department      8/10/2018 2:00 PM Shauna Spears DO [facility]        Today's Diagnoses     Umbilical hernia without obstruction and without gangrene    -  1    Slow transit constipation           Follow-ups after your visit        Additional Services     GENERAL SURG ADULT REFERRAL       [facility] - \"discomfort\" with umbilical hernia.  CT pending.                  Future tests that were ordered for you today     Open Future Orders        Priority Expected Expires Ordered    CT Abdomen Pelvis w/o Contrast Routine  8/10/2019 8/10/2018            Who to contact     If you have questions or need follow up information about today's clinic visit or your schedule please contact [facility] directly at [phone].  Normal or non-critical lab and imaging results will be communicated to you by MyChart, letter or phone within 4 business days after the clinic has received the results. If you do not hear from us within 7 days, please contact the clinic through MyChart or phone. If you have a critical or abnormal lab result, we will notify you by phone as soon as possible.  Submit refill requests through [employer] or call your pharmacy and they will forward the refill request to us. Please allow 3 business days for your refill to be completed.          Additional Information About Your Visit        Care EveryWhere ID     This is your Care EveryWhere ID. This could be used by other organizations to access your [place] medical records  [id number]        Your Vitals Were     Pulse BMI (Body Mass Index)                48 31.14 kg/m2           Blood Pressure from Last 3 Encounters:   08/10/18 130/82   04/25/18 138/86   03/27/18 130/80    Weight from Last 3 Encounters:   08/10/18 212 lb 6.4 oz (96.3 kg) "   04/25/18 215 lb 9.6 oz (97.8 kg)   03/27/18 219 lb 4 oz (99.5 kg)              We Performed the Following     GENERAL SURG ADULT REFERRAL          Today's Medication Changes          These changes are accurate as of 8/10/18  2:17 PM.  If you have any questions, ask your nurse or doctor.               Start taking these medicines.        Dose/Directions    polyethylene glycol powder   Commonly known as:  MIRALAX   Used for:  Slow transit constipation, Umbilical hernia without obstruction and without gangrene   Started by:  Shauna Spears,         Dose:  1 capful   Take 17 g (1 capful) by mouth daily   Quantity:  510 g   Refills:  2            Where to get your medicines      These medications were sent to [facility] [id number] [facility] - [place], [place] - [address]  [address] [facility] [address]     Phone:  [phone]     polyethylene glycol powder                Primary Care Provider Fax #    Physician No Ref-Primary [phone]       No address on file        Equal Access to Services     [facility] : Hadii criss ku hadasho Soomaali, waaxda luqadaha, qaybta kaalmada adeegyada, waxay sagar bullard . So [facility] [phone].    ATENCIÓN: Si habla español, tiene a manzano disposición servicios gratuitos de asistencia lingüística. Llame al [phone].    We comply with applicable federal civil rights laws and [place] laws. We do not discriminate on the basis of race, color, national origin, age, disability, sex, sexual orientation, or gender identity.            Thank you!     Thank you for choosing [facility]  for your care. Our goal is always to provide you with excellent care. Hearing back from our patients is one way we can continue to improve our services. Please take a few minutes to complete the written survey that you may receive in the mail after your visit with us. Thank you!             Your Updated Medication List - Protect others around you:  Learn how to safely use, store and throw away your medicines at www.disposemymeds.org.          This list is accurate as of 8/10/18  2:17 PM.  Always use your most recent med list.                   Brand Name Dispense Instructions for use Diagnosis    polyethylene glycol powder    MIRALAX    510 g    Take 17 g (1 capful) by mouth daily    Slow transit constipation, Umbilical hernia without obstruction and without gangrene

## 2018-08-14 ENCOUNTER — TELEPHONE (OUTPATIENT)
Dept: FAMILY MEDICINE | Facility: OTHER | Age: 38
End: 2018-08-14

## 2018-08-14 ENCOUNTER — HOSPITAL ENCOUNTER (OUTPATIENT)
Dept: CT IMAGING | Facility: OTHER | Age: 38
Discharge: HOME | End: 2018-08-14
Attending: FAMILY MEDICINE | Admitting: FAMILY MEDICINE
Payer: COMMERCIAL

## 2018-08-14 DIAGNOSIS — K42.9 UMBILICAL HERNIA WITHOUT OBSTRUCTION AND WITHOUT GANGRENE: ICD-10-CM

## 2018-08-14 DIAGNOSIS — K42.9 UMBILICAL HERNIA WITHOUT OBSTRUCTION AND WITHOUT GANGRENE: Primary | ICD-10-CM

## 2018-08-14 PROCEDURE — 74176 CT ABD & PELVIS W/O CONTRAST: CPT

## 2018-08-14 NOTE — TELEPHONE ENCOUNTER
Call and notify - CT scan confirms small umbilical hernia.  Continue to keep appointment with general surgery to discuss possible repair vs other treatment options.  Shauna Spears

## 2018-08-14 NOTE — TELEPHONE ENCOUNTER
Patient needs to be set up with general surgery.  Lore Sher............................... 8/14/2018 9:34 AM

## 2018-11-29 ENCOUNTER — OFFICE VISIT (OUTPATIENT)
Dept: SURGERY | Facility: OTHER | Age: 38
End: 2018-11-29
Attending: FAMILY MEDICINE
Payer: COMMERCIAL

## 2018-11-29 VITALS
BODY MASS INDEX: 32.43 KG/M2 | HEIGHT: 68 IN | WEIGHT: 214 LBS | SYSTOLIC BLOOD PRESSURE: 110 MMHG | DIASTOLIC BLOOD PRESSURE: 80 MMHG

## 2018-11-29 DIAGNOSIS — K42.9 UMBILICAL HERNIA WITHOUT OBSTRUCTION AND WITHOUT GANGRENE: Primary | ICD-10-CM

## 2018-11-29 PROCEDURE — 99203 OFFICE O/P NEW LOW 30 MIN: CPT | Performed by: SURGERY

## 2018-11-29 ASSESSMENT — PAIN SCALES - GENERAL: PAINLEVEL: NO PAIN (0)

## 2018-11-29 NOTE — MR AVS SNAPSHOT
"              After Visit Summary   11/29/2018    Fernando Wong    MRN: [id number]           Patient Information     Date Of Birth          1980        Visit Information        Provider Department      11/29/2018 3:50 PM Pranav Solano MD [facility]        Today's Diagnoses     Umbilical hernia without obstruction and without gangrene    -  1       Follow-ups after your visit        Your next 10 appointments already scheduled     Yobani 10, 2019  1:30 PM CST   [facility]   [facility]    [address]  [place] [id number] [phone]              Who to contact     If you have questions or need follow up information about today's clinic visit or your schedule please contact [facility] directly at [phone].  Normal or non-critical lab and imaging results will be communicated to you by MyChart, letter or phone within 4 business days after the clinic has received the results. If you do not hear from us within 7 days, please contact the clinic through MyChart or phone. If you have a critical or abnormal lab result, we will notify you by phone as soon as possible.  Submit refill requests through [employer] or call your pharmacy and they will forward the refill request to us. Please allow 3 business days for your refill to be completed.          Additional Information About Your Visit        Care EveryWhere ID     This is your Care EveryWhere ID. This could be used by other organizations to access your [place] medical records  [id number]        Your Vitals Were     Height BMI (Body Mass Index)                5' 8\" (1.727 m) 32.54 kg/m2           Blood Pressure from Last 3 Encounters:   11/29/18 110/80   08/10/18 130/82   04/25/18 138/86    Weight from Last 3 Encounters:   11/29/18 214 lb (97.1 kg)   08/10/18 212 lb 6.4 oz (96.3 kg)   04/25/18 215 lb 9.6 oz (97.8 kg)    "           Today, you had the following     No orders found for display       Primary Care Provider Fax #    Physician No Ref-Primary [phone]       No address on file        Equal Access to Services     THANIA ENGLISH : Hadii aad ku hadmichaelsancho Bradley, danimoshe purcellcristiha, cee sarabia, austyn murrellkathi kayleigh. So [facility] [phone].    ATENCIÓN: Si habla español, tiene a manzano disposición servicios gratuitos de asistencia lingüística. Llame al [phone].    We comply with applicable federal civil rights laws and [place] laws. We do not discriminate on the basis of race, color, national origin, age, disability, sex, sexual orientation, or gender identity.            Thank you!     Thank you for choosing [facility] AND [facility]  for your care. Our goal is always to provide you with excellent care. Hearing back from our patients is one way we can continue to improve our services. Please take a few minutes to complete the written survey that you may receive in the mail after your visit with us. Thank you!             Your Updated Medication List - Protect others around you: Learn how to safely use, store and throw away your medicines at www.disposemymeds.org.          This list is accurate as of 11/29/18  5:04 PM.  Always use your most recent med list.                   Brand Name Dispense Instructions for use Diagnosis    polyethylene glycol powder    MIRALAX    510 g    Take 17 g (1 capful) by mouth daily    Slow transit constipation, Umbilical hernia without obstruction and without gangrene

## 2018-11-29 NOTE — PROGRESS NOTES
GENERAL SURGERY OFFICE CONSULTATION NOTE    Patient Name: Fernando Wong  Address: [address] [facility] [address]  Age:38 year old  Sex: male     Primary Care Physician: No Ref-Primary, Physician    I wasrequested to see this patient in consultation by Dr Spears for evaluation of umbilical bulge. A copy of my findings and recommendations will be sent to Dr Spears    HPI:   The patient is 38 year old male with umbilicalbulge and pain. The patient first noted this a few months ago. The bulge was large one time after lifting. He runs in the summer and lifts weights in the winter.  No nausea or vomiting. No problems with new diarrhea or constipation. No skin redness or rash at the umbilicus. No previous umbilical hernia surgery.    REVIEW OF SYSTEMS  GENERAL: No fevers or chills. Denies fatigue, recent weight loss.  HEENT: No sinus drainage. No changes with vision or hearing. No difficulty swallowing.   LYMPHATICS:  No swollen nodes inaxilla, neck or groin.  CARDIOVASCULAR: Denies chest pain, palpitations and dyspnea on exertion.  PULMONARY: No shortness of breath or cough. No increase in sputum production.  GI: Denies melena, bright red blood instools. No hematemesis. No constipation or diarrhea.  : No dysuria or hematuria.  SKIN: No recent rashes or ulcers.   HEMATOLOGY:  No history of easy bruising or bleeding.  ENDOCRINE:  No history of diabetes orthyroid problems.  NEUROLOGY:  No history of seizures or headaches. No motor or sensory changes.     PAST MEDICAL HISTORY  No past medical history on file.    PAST SURGICAL HISTORY    Past Surgical History:   Procedure Laterality Date     EXTRACTION(S) DENTAL      No Comments Provided       CURRENT MEDS  Current Outpatient Prescriptions   Medication     polyethylene glycol (MIRALAX) powder     No current facility-administered medications for this visit.      ALLERGIES/SENSITIVITIES  No Known Allergies  FAMILY HISTORY    Family History   Problem Relation  "Age of Onset     Cancer Maternal Grandfather 50     Cancer,colon cancer; skin cancer       SOCIALHISTORY    Social History     Social History     Marital status: Single     Spouse name: N/A     Number of children: N/A     Years of education: N/A     Occupational History     Not on file.     Social History Main Topics     Smoking status: Never Smoker     Smokeless tobacco: Never Used     Alcohol use Yes      Comment: Less than 1 per month     Drug use: No     Sexual activity: Not on file     Other Topics Concern     Not on file     Social History Narrative     Single, works at [employer] in [employer];  No children.  Non smoker.        PHYSICAL EXAM  /80 (BP Location: Right arm, Patient Position: Sitting, Cuff Size: Adult Large)  Ht 5' 8\" (1.727 m)  Wt 214 lb (97.1 kg)  BMI 32.54 kg/m2    Body mass index is 32.54 kg/(m^2).    GENERAL: Healthy appearing patient in no acute distress. Pleasant and cooperative with exam and interview.   HEENT: Head-normocephalic. Eyes-no scleralicterus, pupils equal, round, and reactive to light. Nose-no nasal drainage. No lesions. Mouth-oral mucosa pink and moist, no lesions.  NECK: Supple. No thyroid nodules. Trachea midline.  LYMPHATICS:  No cervical,axillary or supraclavicular adenopathy.  CV: Regular rate and rhythm, no murmurs. No peripheral edema.  LUNGS:  No respiratory distress. Clear bilaterally to auscultation.  ABDOMEN: Non distended. Bowel soundsactive. Soft, non-tender, no hepatosplenomegaly. Umbilical hernia noted, no tenderness, reducible. No peritoneal signs.  SKIN: Pink, warm and dry. No jaundice. No rash.  NEURO:  Cranial nerves II-XII grossly intact.Alert and oriented.  PSYCH: Appropriate mood and affect.    IMAGING/LAB  I personally reviewed patient's CT scan. Small defect.       CONSULTATION ASSESSMENT AND PLAN/RECOMMENDATIONS: I discussed with the patient the pathophysiology ofumbilical hernias. I explained the risks, benefits and alternatives to repair of umbilical " hernia with mesh. We specifically discussed the risks of infection, bleeding, injury to intra-abdominal organs, mesh complications and hernia recurrence. We discussed post operative limitations and expected recovery time. The patient's questions were answered and the patient wishes to proceed with repair. Informed consent paperwork was completed. Thiswill be scheduled at a time that is convenient for the patient. The patient will call with questions or concerns prior to the procedure.      Pranav Solano MD on 11/29/2018 at 5:02 PM

## 2018-11-29 NOTE — NURSING NOTE
"Chief Complaint   Patient presents with     Consult     umbilical hernia       Initial /80 (BP Location: Right arm, Patient Position: Sitting, Cuff Size: Adult Large)  Ht 5' 8\" (1.727 m)  Wt 214 lb (97.1 kg)  BMI 32.54 kg/m2 Estimated body mass index is 32.54 kg/(m^2) as calculated from the following:    Height as of this encounter: 5' 8\" (1.727 m).    Weight as of this encounter: 214 lb (97.1 kg).  Medication Reconciliation: complete    Migdalia Duval LPN  "

## 2018-11-29 NOTE — H&P (VIEW-ONLY)
GENERAL SURGERY OFFICE CONSULTATION NOTE    Patient Name: Fernando Wong  Address: [address] [facility] [address]  Age:38 year old  Sex: male     Primary Care Physician: No Ref-Primary, Physician    I wasrequested to see this patient in consultation by Dr Spears for evaluation of umbilical bulge. A copy of my findings and recommendations will be sent to Dr Spears    HPI:   The patient is 38 year old male with umbilicalbulge and pain. The patient first noted this a few months ago. The bulge was large one time after lifting. He runs in the summer and lifts weights in the winter.  No nausea or vomiting. No problems with new diarrhea or constipation. No skin redness or rash at the umbilicus. No previous umbilical hernia surgery.    REVIEW OF SYSTEMS  GENERAL: No fevers or chills. Denies fatigue, recent weight loss.  HEENT: No sinus drainage. No changes with vision or hearing. No difficulty swallowing.   LYMPHATICS:  No swollen nodes inaxilla, neck or groin.  CARDIOVASCULAR: Denies chest pain, palpitations and dyspnea on exertion.  PULMONARY: No shortness of breath or cough. No increase in sputum production.  GI: Denies melena, bright red blood instools. No hematemesis. No constipation or diarrhea.  : No dysuria or hematuria.  SKIN: No recent rashes or ulcers.   HEMATOLOGY:  No history of easy bruising or bleeding.  ENDOCRINE:  No history of diabetes orthyroid problems.  NEUROLOGY:  No history of seizures or headaches. No motor or sensory changes.     PAST MEDICAL HISTORY  No past medical history on file.    PAST SURGICAL HISTORY    Past Surgical History:   Procedure Laterality Date     EXTRACTION(S) DENTAL      No Comments Provided       CURRENT MEDS  Current Outpatient Prescriptions   Medication     polyethylene glycol (MIRALAX) powder     No current facility-administered medications for this visit.      ALLERGIES/SENSITIVITIES  No Known Allergies  FAMILY HISTORY    Family History   Problem Relation  "Age of Onset     Cancer Maternal Grandfather 50     Cancer,colon cancer; skin cancer       SOCIALHISTORY    Social History     Social History     Marital status: Single     Spouse name: N/A     Number of children: N/A     Years of education: N/A     Occupational History     Not on file.     Social History Main Topics     Smoking status: Never Smoker     Smokeless tobacco: Never Used     Alcohol use Yes      Comment: Less than 1 per month     Drug use: No     Sexual activity: Not on file     Other Topics Concern     Not on file     Social History Narrative     Single, works at [employer] in [employer];  No children.  Non smoker.        PHYSICAL EXAM  /80 (BP Location: Right arm, Patient Position: Sitting, Cuff Size: Adult Large)  Ht 5' 8\" (1.727 m)  Wt 214 lb (97.1 kg)  BMI 32.54 kg/m2    Body mass index is 32.54 kg/(m^2).    GENERAL: Healthy appearing patient in no acute distress. Pleasant and cooperative with exam and interview.   HEENT: Head-normocephalic. Eyes-no scleralicterus, pupils equal, round, and reactive to light. Nose-no nasal drainage. No lesions. Mouth-oral mucosa pink and moist, no lesions.  NECK: Supple. No thyroid nodules. Trachea midline.  LYMPHATICS:  No cervical,axillary or supraclavicular adenopathy.  CV: Regular rate and rhythm, no murmurs. No peripheral edema.  LUNGS:  No respiratory distress. Clear bilaterally to auscultation.  ABDOMEN: Non distended. Bowel soundsactive. Soft, non-tender, no hepatosplenomegaly. Umbilical hernia noted, no tenderness, reducible. No peritoneal signs.  SKIN: Pink, warm and dry. No jaundice. No rash.  NEURO:  Cranial nerves II-XII grossly intact.Alert and oriented.  PSYCH: Appropriate mood and affect.    IMAGING/LAB  I personally reviewed patient's CT scan. Small defect.       CONSULTATION ASSESSMENT AND PLAN/RECOMMENDATIONS: I discussed with the patient the pathophysiology ofumbilical hernias. I explained the risks, benefits and alternatives to repair of umbilical " hernia with mesh. We specifically discussed the risks of infection, bleeding, injury to intra-abdominal organs, mesh complications and hernia recurrence. We discussed post operative limitations and expected recovery time. The patient's questions were answered and the patient wishes to proceed with repair. Informed consent paperwork was completed. Thiswill be scheduled at a time that is convenient for the patient. The patient will call with questions or concerns prior to the procedure.      Pranav Solano MD on 11/29/2018 at 5:02 PM

## 2018-12-27 ENCOUNTER — ANESTHESIA EVENT (OUTPATIENT)
Dept: SURGERY | Facility: OTHER | Age: 38
End: 2018-12-27
Payer: COMMERCIAL

## 2018-12-28 ENCOUNTER — HOSPITAL ENCOUNTER (OUTPATIENT)
Facility: OTHER | Age: 38
Discharge: HOME | End: 2018-12-28
Attending: SURGERY | Admitting: SURGERY
Payer: COMMERCIAL

## 2018-12-28 ENCOUNTER — ANESTHESIA (OUTPATIENT)
Dept: SURGERY | Facility: OTHER | Age: 38
End: 2018-12-28
Payer: COMMERCIAL

## 2018-12-28 VITALS
DIASTOLIC BLOOD PRESSURE: 81 MMHG | TEMPERATURE: 98.5 F | HEART RATE: 79 BPM | OXYGEN SATURATION: 93 % | SYSTOLIC BLOOD PRESSURE: 139 MMHG | RESPIRATION RATE: 16 BRPM

## 2018-12-28 DIAGNOSIS — K42.9 UMBILICAL HERNIA WITHOUT OBSTRUCTION AND WITHOUT GANGRENE: Primary | ICD-10-CM

## 2018-12-28 PROCEDURE — 49585 AS REPAIR UMBILICAL HERN,5+Y/O,REDUC: CPT | Performed by: NURSE ANESTHETIST, CERTIFIED REGISTERED

## 2018-12-28 PROCEDURE — 25000125 ZZHC RX 250: Performed by: NURSE ANESTHETIST, CERTIFIED REGISTERED

## 2018-12-28 PROCEDURE — 25000132 ZZH RX MED GY IP 250 OP 250 PS 637: Performed by: SURGERY

## 2018-12-28 PROCEDURE — 37000008 ZZH ANESTHESIA TECHNICAL FEE, 1ST 30 MIN: Performed by: SURGERY

## 2018-12-28 PROCEDURE — 25000128 H RX IP 250 OP 636: Performed by: SURGERY

## 2018-12-28 PROCEDURE — 37000009 ZZH ANESTHESIA TECHNICAL FEE, EACH ADDTL 15 MIN: Performed by: SURGERY

## 2018-12-28 PROCEDURE — 71000014 ZZH RECOVERY PHASE 1 LEVEL 2 FIRST HR: Performed by: SURGERY

## 2018-12-28 PROCEDURE — C1781 MESH (IMPLANTABLE): HCPCS | Performed by: SURGERY

## 2018-12-28 PROCEDURE — 49585 ZZHC REPAIR UMBILICAL HERN,5+Y/O,REDUC: CPT | Performed by: SURGERY

## 2018-12-28 PROCEDURE — 27210794 ZZH OR GENERAL SUPPLY STERILE: Performed by: SURGERY

## 2018-12-28 PROCEDURE — 36000050 ZZH SURGERY LEVEL 2 1ST 30 MIN: Performed by: SURGERY

## 2018-12-28 PROCEDURE — 36000052 ZZH SURGERY LEVEL 2 EA 15 ADDTL MIN: Performed by: SURGERY

## 2018-12-28 PROCEDURE — 25000128 H RX IP 250 OP 636: Performed by: NURSE ANESTHETIST, CERTIFIED REGISTERED

## 2018-12-28 PROCEDURE — 25000125 ZZHC RX 250: Performed by: SURGERY

## 2018-12-28 PROCEDURE — 71000027 ZZH RECOVERY PHASE 2 EACH 15 MINS: Performed by: SURGERY

## 2018-12-28 PROCEDURE — 25000564 ZZH DESFLURANE, EA 15 MIN: Performed by: SURGERY

## 2018-12-28 PROCEDURE — 40000306 ZZH STATISTIC PRE PROC ASSESS II: Performed by: SURGERY

## 2018-12-28 DEVICE — IMPLANTABLE DEVICE: Type: IMPLANTABLE DEVICE | Site: UMBILICAL | Status: FUNCTIONAL

## 2018-12-28 RX ORDER — FENTANYL CITRATE 50 UG/ML
INJECTION, SOLUTION INTRAMUSCULAR; INTRAVENOUS PRN
Status: DISCONTINUED | OUTPATIENT
Start: 2018-12-28 | End: 2018-12-28

## 2018-12-28 RX ORDER — OXYCODONE AND ACETAMINOPHEN 5; 325 MG/1; MG/1
1-2 TABLET ORAL EVERY 4 HOURS PRN
Qty: 12 TABLET | Refills: 0 | Status: SHIPPED | OUTPATIENT
Start: 2018-12-28 | End: 2019-05-30

## 2018-12-28 RX ORDER — ONDANSETRON 2 MG/ML
INJECTION INTRAMUSCULAR; INTRAVENOUS PRN
Status: DISCONTINUED | OUTPATIENT
Start: 2018-12-28 | End: 2018-12-28

## 2018-12-28 RX ORDER — PROPOFOL 10 MG/ML
INJECTION, EMULSION INTRAVENOUS PRN
Status: DISCONTINUED | OUTPATIENT
Start: 2018-12-28 | End: 2018-12-28

## 2018-12-28 RX ORDER — CEFAZOLIN SODIUM 1 G/50ML
1 INJECTION, SOLUTION INTRAVENOUS SEE ADMIN INSTRUCTIONS
Status: DISCONTINUED | OUTPATIENT
Start: 2018-12-28 | End: 2018-12-28 | Stop reason: HOSPADM

## 2018-12-28 RX ORDER — LIDOCAINE HYDROCHLORIDE 20 MG/ML
INJECTION, SOLUTION INFILTRATION; PERINEURAL PRN
Status: DISCONTINUED | OUTPATIENT
Start: 2018-12-28 | End: 2018-12-28

## 2018-12-28 RX ORDER — FENTANYL CITRATE 50 UG/ML
25-50 INJECTION, SOLUTION INTRAMUSCULAR; INTRAVENOUS
Status: DISCONTINUED | OUTPATIENT
Start: 2018-12-28 | End: 2018-12-28 | Stop reason: HOSPADM

## 2018-12-28 RX ORDER — HYDROMORPHONE HYDROCHLORIDE 1 MG/ML
.3-.5 INJECTION, SOLUTION INTRAMUSCULAR; INTRAVENOUS; SUBCUTANEOUS EVERY 10 MIN PRN
Status: DISCONTINUED | OUTPATIENT
Start: 2018-12-28 | End: 2018-12-28 | Stop reason: HOSPADM

## 2018-12-28 RX ORDER — OXYCODONE HYDROCHLORIDE 5 MG/1
5-10 TABLET ORAL
Qty: 12 TABLET | Refills: 0 | Status: SHIPPED | OUTPATIENT
Start: 2018-12-28 | End: 2018-12-28

## 2018-12-28 RX ORDER — SODIUM CHLORIDE, SODIUM LACTATE, POTASSIUM CHLORIDE, CALCIUM CHLORIDE 600; 310; 30; 20 MG/100ML; MG/100ML; MG/100ML; MG/100ML
INJECTION, SOLUTION INTRAVENOUS CONTINUOUS
Status: DISCONTINUED | OUTPATIENT
Start: 2018-12-28 | End: 2018-12-28 | Stop reason: HOSPADM

## 2018-12-28 RX ORDER — ONDANSETRON 2 MG/ML
4 INJECTION INTRAMUSCULAR; INTRAVENOUS EVERY 30 MIN PRN
Status: DISCONTINUED | OUTPATIENT
Start: 2018-12-28 | End: 2018-12-28 | Stop reason: HOSPADM

## 2018-12-28 RX ORDER — BUPIVACAINE HYDROCHLORIDE AND EPINEPHRINE 5; 5 MG/ML; UG/ML
INJECTION, SOLUTION EPIDURAL; INTRACAUDAL; PERINEURAL PRN
Status: DISCONTINUED | OUTPATIENT
Start: 2018-12-28 | End: 2018-12-28 | Stop reason: HOSPADM

## 2018-12-28 RX ORDER — NALOXONE HYDROCHLORIDE 0.4 MG/ML
.1-.4 INJECTION, SOLUTION INTRAMUSCULAR; INTRAVENOUS; SUBCUTANEOUS
Status: DISCONTINUED | OUTPATIENT
Start: 2018-12-28 | End: 2018-12-28 | Stop reason: HOSPADM

## 2018-12-28 RX ORDER — PROPOFOL 10 MG/ML
INJECTION, EMULSION INTRAVENOUS CONTINUOUS PRN
Status: DISCONTINUED | OUTPATIENT
Start: 2018-12-28 | End: 2018-12-28

## 2018-12-28 RX ORDER — OXYCODONE HYDROCHLORIDE 5 MG/1
5-10 TABLET ORAL EVERY 4 HOURS PRN
Status: DISCONTINUED | OUTPATIENT
Start: 2018-12-28 | End: 2018-12-28 | Stop reason: HOSPADM

## 2018-12-28 RX ORDER — CEFAZOLIN SODIUM 2 G/100ML
2 INJECTION, SOLUTION INTRAVENOUS
Status: COMPLETED | OUTPATIENT
Start: 2018-12-28 | End: 2018-12-28

## 2018-12-28 RX ORDER — IBUPROFEN 200 MG
600 TABLET ORAL
Status: CANCELLED | OUTPATIENT
Start: 2018-12-28

## 2018-12-28 RX ORDER — LIDOCAINE 40 MG/G
CREAM TOPICAL
Status: DISCONTINUED | OUTPATIENT
Start: 2018-12-28 | End: 2018-12-28 | Stop reason: HOSPADM

## 2018-12-28 RX ORDER — ONDANSETRON 4 MG/1
4 TABLET, ORALLY DISINTEGRATING ORAL EVERY 30 MIN PRN
Status: DISCONTINUED | OUTPATIENT
Start: 2018-12-28 | End: 2018-12-28 | Stop reason: HOSPADM

## 2018-12-28 RX ORDER — OXYCODONE AND ACETAMINOPHEN 5; 325 MG/1; MG/1
1 TABLET ORAL
Status: CANCELLED | OUTPATIENT
Start: 2018-12-28

## 2018-12-28 RX ORDER — MEPERIDINE HYDROCHLORIDE 50 MG/ML
12.5 INJECTION INTRAMUSCULAR; INTRAVENOUS; SUBCUTANEOUS
Status: DISCONTINUED | OUTPATIENT
Start: 2018-12-28 | End: 2018-12-28 | Stop reason: HOSPADM

## 2018-12-28 RX ORDER — DEXAMETHASONE SODIUM PHOSPHATE 4 MG/ML
INJECTION, SOLUTION INTRA-ARTICULAR; INTRALESIONAL; INTRAMUSCULAR; INTRAVENOUS; SOFT TISSUE PRN
Status: DISCONTINUED | OUTPATIENT
Start: 2018-12-28 | End: 2018-12-28

## 2018-12-28 RX ORDER — KETAMINE HYDROCHLORIDE 10 MG/ML
INJECTION INTRAMUSCULAR; INTRAVENOUS PRN
Status: DISCONTINUED | OUTPATIENT
Start: 2018-12-28 | End: 2018-12-28

## 2018-12-28 RX ADMIN — FENTANYL CITRATE 25 MCG: 50 INJECTION, SOLUTION INTRAMUSCULAR; INTRAVENOUS at 07:45

## 2018-12-28 RX ADMIN — CEFAZOLIN SODIUM 2 G: 2 INJECTION, SOLUTION INTRAVENOUS at 07:42

## 2018-12-28 RX ADMIN — LIDOCAINE HYDROCHLORIDE 80 MG: 20 INJECTION, SOLUTION INFILTRATION; PERINEURAL at 07:46

## 2018-12-28 RX ADMIN — PROPOFOL 100 MCG/KG/MIN: 10 INJECTION, EMULSION INTRAVENOUS at 07:46

## 2018-12-28 RX ADMIN — SODIUM CHLORIDE, SODIUM LACTATE, POTASSIUM CHLORIDE, AND CALCIUM CHLORIDE: 600; 310; 30; 20 INJECTION, SOLUTION INTRAVENOUS at 07:36

## 2018-12-28 RX ADMIN — LIDOCAINE HYDROCHLORIDE 0.1 ML: 10 INJECTION, SOLUTION EPIDURAL; INFILTRATION; INTRACAUDAL; PERINEURAL at 07:36

## 2018-12-28 RX ADMIN — Medication 25 MG: at 07:45

## 2018-12-28 RX ADMIN — HYDROMORPHONE HYDROCHLORIDE 0.5 MG: 1 INJECTION, SOLUTION INTRAMUSCULAR; INTRAVENOUS; SUBCUTANEOUS at 09:09

## 2018-12-28 RX ADMIN — FENTANYL CITRATE 25 MCG: 50 INJECTION, SOLUTION INTRAMUSCULAR; INTRAVENOUS at 07:53

## 2018-12-28 RX ADMIN — MIDAZOLAM 2 MG: 1 INJECTION INTRAMUSCULAR; INTRAVENOUS at 07:45

## 2018-12-28 RX ADMIN — ONDANSETRON 4 MG: 2 INJECTION INTRAMUSCULAR; INTRAVENOUS at 07:46

## 2018-12-28 RX ADMIN — DEXAMETHASONE SODIUM PHOSPHATE 4 MG: 4 INJECTION, SOLUTION INTRA-ARTICULAR; INTRALESIONAL; INTRAMUSCULAR; INTRAVENOUS; SOFT TISSUE at 07:50

## 2018-12-28 RX ADMIN — FENTANYL CITRATE 25 MCG: 50 INJECTION, SOLUTION INTRAMUSCULAR; INTRAVENOUS at 08:00

## 2018-12-28 RX ADMIN — OXYCODONE HYDROCHLORIDE 5 MG: 5 TABLET ORAL at 09:45

## 2018-12-28 RX ADMIN — FENTANYL CITRATE 25 MCG: 50 INJECTION, SOLUTION INTRAMUSCULAR; INTRAVENOUS at 07:50

## 2018-12-28 RX ADMIN — PROPOFOL 200 MG: 10 INJECTION, EMULSION INTRAVENOUS at 07:46

## 2018-12-28 RX ADMIN — FENTANYL CITRATE 50 MCG: 50 INJECTION, SOLUTION INTRAMUSCULAR; INTRAVENOUS at 08:10

## 2018-12-28 RX ADMIN — FENTANYL CITRATE 50 MCG: 50 INJECTION, SOLUTION INTRAMUSCULAR; INTRAVENOUS at 08:44

## 2018-12-28 RX ADMIN — FENTANYL CITRATE 50 MCG: 50 INJECTION, SOLUTION INTRAMUSCULAR; INTRAVENOUS at 08:38

## 2018-12-28 NOTE — ANESTHESIA POSTPROCEDURE EVALUATION
Patient: Fernando Wong    Procedure(s):  Open Umbilical Hernia Repair with mesh    Diagnosis:umbilical hernia  Diagnosis Additional Information: No value filed.    Anesthesia Type:  General, LMA    Note:  Anesthesia Post Evaluation    Patient location during evaluation: Phase 2  Patient participation: Able to fully participate in evaluation  Level of consciousness: awake and alert  Pain management: adequate  Airway patency: patent  Cardiovascular status: acceptable  Respiratory status: acceptable  Hydration status: acceptable  PONV: none     Anesthetic complications: None          Last vitals:  Vitals:    12/28/18 0850 12/28/18 0856 12/28/18 0900   BP: 134/86 (!) 132/92 135/86   Pulse: 84 79 81   Resp:  16    Temp: 97.3  F (36.3  C) 98.5  F (36.9  C)    SpO2:  95% 94%         Electronically Signed By: MARILU FERNANDEZ CRNA  December 28, 2018  9:29 AM

## 2018-12-28 NOTE — ANESTHESIA PREPROCEDURE EVALUATION
Anesthesia Pre-Procedure Evaluation    Patient: Fernando Wong   MRN: [id number] : 1980          Preoperative Diagnosis: umbilical hernia    Procedure(s):  Open Umbilical Hernia Repair    History reviewed. No pertinent past medical history.  Past Surgical History:   Procedure Laterality Date     EXTRACTION(S) DENTAL      No Comments Provided       Anesthesia Evaluation     . Pt has had prior anesthetic.     No history of anesthetic complications          ROS/MED HX    ENT/Pulmonary:  - neg pulmonary ROS     Neurologic:  - neg neurologic ROS     Cardiovascular:  - neg cardiovascular ROS       METS/Exercise Tolerance:  >4 METS   Hematologic:  - neg hematologic  ROS       Musculoskeletal:  - neg musculoskeletal ROS       GI/Hepatic:  - neg GI/hepatic ROS       Renal/Genitourinary:  - ROS Renal section negative       Endo:  - neg endo ROS       Psychiatric:  - neg psychiatric ROS       Infectious Disease:  - neg infectious disease ROS       Malignancy:      - no malignancy   Other:    - neg other ROS                      Physical Exam  Normal systems: cardiovascular, pulmonary and dental    Airway   Mallampati: I  TM distance: >3 FB  Neck ROM: full  Comment: Limited mouth opening (3 cm)    Dental     Cardiovascular   Rhythm and rate: regular and normal      Pulmonary    breath sounds clear to auscultation            Lab Results   Component Value Date    WBC 9.8 2007    HGB 16.2 2007    HCT 47.2 2007     2007     2007    POTASSIUM 4.6 2007    CHLORIDE 106 2007    CO2 22 2007    BUN 16 2007    CR 1.15 2007    GLC 91 2007    RE 9.4 2007    PTT 35 2007    INR 1.09 2007       Preop Vitals  BP Readings from Last 3 Encounters:   18 110/80   08/10/18 130/82   18 138/86    Pulse Readings from Last 3 Encounters:   08/10/18 (!) 48   18 62   17 56      Resp Readings from Last 3 Encounters:   12 14  "   SpO2 Readings from Last 3 Encounters:   No data found for SpO2      Temp Readings from Last 1 Encounters:   04/25/18 98.4  F (36.9  C) (Tympanic)    Ht Readings from Last 1 Encounters:   11/29/18 1.727 m (5' 8\")      Wt Readings from Last 1 Encounters:   11/29/18 97.1 kg (214 lb)    Estimated body mass index is 32.54 kg/m  as calculated from the following:    Height as of 11/29/18: 1.727 m (5' 8\").    Weight as of 11/29/18: 97.1 kg (214 lb).       Anesthesia Plan      History & Physical Review      ASA Status:  2 .    NPO Status:  > 6 hours    Plan for General and LMA with Propofol induction. Maintenance will be Balanced.    PONV prophylaxis:  Ondansetron (or other 5HT-3)       Postoperative Care      Consents  Anesthetic plan, risks, benefits and alternatives discussed with:  Patient..                 MARILU FERNANDEZ CRNA  "

## 2018-12-28 NOTE — OP NOTE
Preoperative Diagnosis:  Umbilical hernia, initial and reducible    Postoperative Diagnosis:  Umbilical hernia, initial and reducible    Procedure:  Umbilical hernia repair with medium Ventralex ST mesh    Surgeon: Pranav Solano    Circulator: Mariela Jones RN  Scrub Person: Ekaterina Wheatley  First Assistant: Penelope Herrera RN  2nd Scrub: Rajwinder Carranza      Anesthesia:  General    Estimated Blood Loss:2 ml      INDICATIONS:  Please see the office consultation.  The patient was explained risks and benefits of the procedure including but not limited to bleeding and possible infection, possible recurrence, possible mesh placement.  Possible damage to the bowel or surrounding tissues, possible chronic pain syndrome.  Also no heavylifting, pushing, pulling or strenuous activies more than 20 pounds for at least four weeks after surgery.  Patient appears to understand and wishes to proceed.  Written informed consent is obtained.    PROCEDURE:  The patient was brought to the operating room and placed in a supine position on the operating table.  After general anesthestic was given by anesthesia, the patient was positioned, prepped, and draped in the usual sterile fashion.  A timeout was taken to ensure the patient's name, site, and procedure to be performed.  All in the operating room were in agreement.  The operation then commenced.  Local anesthesia was injected and aninfra-umbilical curvilinear incision made.  Dissection was carried down through the subcutaneous tissues and the hernia sac dissected circumferentially down to its base away from the umbilicus.  The hernia sac cleared from the fascia at the base. The hernia sac was returned to the abdomen.  The defect was 1 cm in diameter.  A 6 cm Ventralex ST mesh was obtained and passed through the defect into the preperitoneal space, both straps were pulled up and the mesh deployed circumferentially with forceps.     Hemostasis was assured and the mesh was  lying flat and in good position.  The straps were secured to the fascia with 0-Vicryl pop-off sutures.  The edges were closed with interrupted 0-vicryl sutures also, as well as the fascial edges and the mesh remained flat and in good position.  Hemostasis was assured, and additional local was injected.  The inferior aspect of the umbilicus was secured back down to the fascia with a 3-0 vicryl sutures.  The skin edges were re-approximated with a running intracuticular 4-0 monocryl suture.  The incision was cleaned, Derma bond was applied and the incision was dressed with cotton ball and tegaderm .  The patient tolerated the procedure well, was returned tothe recovery area in a stable condition.  The sponge, needle, and instrument counts were correct at the end of the procedure.  There were no complications.

## 2018-12-28 NOTE — ANESTHESIA CARE TRANSFER NOTE
Patient: Fernando Wong    Procedure(s):  Open Umbilical Hernia Repair with mesh    Diagnosis: umbilical hernia  Diagnosis Additional Information: No value filed.    Anesthesia Type:   General, LMA     Note:  Airway :Face Mask  Patient transferred to:PACU  Handoff Report: Identifed the Patient, Identified the Reponsible Provider, Reviewed the pertinent medical history, Discussed the surgical course, Reviewed Intra-OP anesthesia mangement and issues during anesthesia, Set expectations for post-procedure period and Allowed opportunity for questions and acknowledgement of understanding      Vitals: (Last set prior to Anesthesia Care Transfer)    CRNA VITALS  12/28/2018 0757 - 12/28/2018 0829      12/28/2018             Pulse:  98    Ht Rate:  98    SpO2:  100 %    Resp Rate (observed):  36  (Abnormal)     Resp Rate (set):  10                Electronically Signed By: MARILU FERNANDEZ CRNA  December 28, 2018  8:29 AM

## 2018-12-28 NOTE — DISCHARGE INSTRUCTIONS
[facility]   Adult Discharge Orders & Instructions     For 24 hours after surgery    1. Get plenty of rest.  A responsible adult must stay with you for at least 24 hours after you leave the hospital.   2. Do not drive or use heavy equipment.  If you have weakness or tingling, don't drive or use heavy equipment until this feeling goes away.  3. Do not drink alcohol.  4. Avoid strenuous or risky activities.  Ask for help when climbing stairs.   5. You may feel lightheaded.  IF so, sit for a few minutes before standing.  Have someone help you get up.   6. If you have nausea (feel sick to your stomach): Drink only clear liquids such as apple juice, ginger ale, broth or 7-Up.  Rest may also help.  Be sure to drink enough fluids.  Move to a regular diet as you feel able.  7. You may have a slight fever. Call the doctor if your fever is over 101 F (38.3 C) (taken under the tongue) or lasts longer than 24 hours.  8. You may have a dry mouth, a sore throat, muscle aches or trouble sleeping.  These should go away after 24 hours.  9. Do not make important or legal decisions.   Call your doctor for any of the followin.  Signs of infection (fever, growing tenderness at the surgery site, a large amount of drainage or bleeding, severe pain, foul-smelling drainage, redness, swelling).    2. It has been over 8 to 10 hours since surgery and you are still not able to urinate (pass water).    3.  Headache for over 24 hours.    4.  Numbness, tingling or weakness the day after surgery (if you had spinal anesthesia).  To contact a doctor, call    [phone]___________________________

## 2018-12-28 NOTE — INTERVAL H&P NOTE
I saw and examined Fernando Wong.  I have reviewed the history and physical and find no changes to the patient's medical status or condition with the exceptions noted below.     Pranav Solano   7:14 AM 12/28/2018

## 2018-12-28 NOTE — OR NURSING
Fernando has been discharged to home at 1100 via W/C accompanied by CANDE Purdy    Written discharge instructions were provided to FERNANDO and Rajwinder.  Prescriptions were filled and sent with him.      Patient and adult caring for them verbalize understanding of discharge instructions including no driving until tomorrow and no longer taking narcotic pain medications - no operating mechanical equipment and no making any important decisions.They understand reason for discharge, and necessary follow-up appointments. Pt up voided with out difficulty does become light headed after getting dressed laid down for short time and then taken to car

## 2018-12-28 NOTE — OR NURSING
PACU Transfer Note    Fernando Wong was transferred to DSU via cart.  Equipment used for transport:  none.  Accompanied by:  Monica PANDA RN - Report given to Tiffany HILLIARD RN    PACU Respiratory Event Documentation     1) Episodes of Apnea greater than or equal to 10 seconds: 0    2) Bradypnea - less than 8 breaths per minute: 0    3) Pain score on 0 to 10 scale: 2/10    4) Pain-sedation mismatch (yes or no): no    5) Repeated 02 desaturation less than 90% (yes or no): no    Anesthesia notified? (yes or no): na    Any of the above events occuring repeatedly in separate 30 minute intervals may be considered recurrent PACU respiratory events.    Patient stable and meets phase 1 discharge criteria for transport from PACU.

## 2019-01-15 ENCOUNTER — OFFICE VISIT (OUTPATIENT)
Dept: SURGERY | Facility: OTHER | Age: 39
End: 2019-01-15
Attending: SURGERY
Payer: COMMERCIAL

## 2019-01-15 VITALS
DIASTOLIC BLOOD PRESSURE: 80 MMHG | BODY MASS INDEX: 32.43 KG/M2 | SYSTOLIC BLOOD PRESSURE: 120 MMHG | HEIGHT: 68 IN | WEIGHT: 214 LBS

## 2019-01-15 DIAGNOSIS — K42.9 UMBILICAL HERNIA WITHOUT OBSTRUCTION AND WITHOUT GANGRENE: ICD-10-CM

## 2019-01-15 DIAGNOSIS — M25.561 ACUTE PAIN OF RIGHT KNEE: Primary | ICD-10-CM

## 2019-01-15 DIAGNOSIS — K59.01 SLOW TRANSIT CONSTIPATION: ICD-10-CM

## 2019-01-15 PROCEDURE — 99024 POSTOP FOLLOW-UP VISIT: CPT | Performed by: SURGERY

## 2019-01-15 ASSESSMENT — PAIN SCALES - GENERAL: PAINLEVEL: NO PAIN (0)

## 2019-01-15 ASSESSMENT — MIFFLIN-ST. JEOR: SCORE: 1870.2

## 2019-01-15 NOTE — NURSING NOTE
"Chief Complaint   Patient presents with     Surgical Followup     umbilical hernia       Initial /80 (BP Location: Right arm, Patient Position: Sitting, Cuff Size: Adult Large)   Ht 1.727 m (5' 8\")   Wt 97.1 kg (214 lb)   BMI 32.54 kg/m   Estimated body mass index is 32.54 kg/m  as calculated from the following:    Height as of this encounter: 1.727 m (5' 8\").    Weight as of this encounter: 97.1 kg (214 lb).  Medication Reconciliation: complete    Migdalia Duval LPN  "

## 2019-01-15 NOTE — PROGRESS NOTES
"Patient presents for post surgical visit after umbilical hernia repair on  12/28. Patient has done well. No problems with incision.  Sore X 1 week. Now very comfortable.     /80 (BP Location: Right arm, Patient Position: Sitting, Cuff Size: Adult Large)   Ht 1.727 m (5' 8\")   Wt 97.1 kg (214 lb)   BMI 32.54 kg/m      General: NAD, pleasant and cooperative with exam and interview.  Abdomen: healing incision. No sign of infection. No pain with palpation.  Healing tissue below umbilicus.   Psychiatry: awake, alert and oriented. Appropriate affect.  Ext: right knee no crepitus. Patella is stable.     Assessment/Plan:  38 year old male s/p umbilical hernia repair. Also has right knee pain. Has pain with lunges or squats mostly.  Lifting restrictions X 2 more weeks. Patient will call with questions or concerns.    - PT referral to Nicholas Solano MD on 1/15/2019 at 3:16 PM     "

## 2019-05-30 ENCOUNTER — OFFICE VISIT (OUTPATIENT)
Dept: FAMILY MEDICINE | Facility: OTHER | Age: 39
End: 2019-05-30
Attending: FAMILY MEDICINE
Payer: COMMERCIAL

## 2019-05-30 VITALS
RESPIRATION RATE: 16 BRPM | TEMPERATURE: 97.9 F | HEIGHT: 68 IN | WEIGHT: 211.6 LBS | HEART RATE: 64 BPM | SYSTOLIC BLOOD PRESSURE: 120 MMHG | BODY MASS INDEX: 32.07 KG/M2 | DIASTOLIC BLOOD PRESSURE: 72 MMHG

## 2019-05-30 DIAGNOSIS — R31.0 GROSS HEMATURIA: ICD-10-CM

## 2019-05-30 DIAGNOSIS — Z12.83 SCREENING FOR SKIN CANCER: Primary | ICD-10-CM

## 2019-05-30 LAB
ALBUMIN UR-MCNC: NEGATIVE MG/DL
APPEARANCE UR: CLEAR
BILIRUB UR QL STRIP: NEGATIVE
C TRACH DNA SPEC QL PROBE+SIG AMP: NOT DETECTED
COLOR UR AUTO: YELLOW
GLUCOSE UR STRIP-MCNC: NEGATIVE MG/DL
HGB UR QL STRIP: ABNORMAL
KETONES UR STRIP-MCNC: NEGATIVE MG/DL
LEUKOCYTE ESTERASE UR QL STRIP: NEGATIVE
N GONORRHOEA DNA SPEC QL PROBE+SIG AMP: NOT DETECTED
NITRATE UR QL: NEGATIVE
PH UR STRIP: 5.5 PH (ref 5–9)
RBC #/AREA URNS AUTO: NORMAL /HPF
SOURCE: ABNORMAL
SP GR UR STRIP: 1.02 (ref 1–1.03)
SPECIMEN SOURCE: NORMAL
UROBILINOGEN UR STRIP-ACNC: 0.2 EU/DL (ref 0.2–1)
WBC #/AREA URNS AUTO: NORMAL /HPF

## 2019-05-30 PROCEDURE — 81001 URINALYSIS AUTO W/SCOPE: CPT | Mod: ZL | Performed by: FAMILY MEDICINE

## 2019-05-30 PROCEDURE — 87591 N.GONORRHOEAE DNA AMP PROB: CPT | Mod: ZL | Performed by: FAMILY MEDICINE

## 2019-05-30 PROCEDURE — 99214 OFFICE O/P EST MOD 30 MIN: CPT | Performed by: FAMILY MEDICINE

## 2019-05-30 PROCEDURE — 87491 CHLMYD TRACH DNA AMP PROBE: CPT | Mod: ZL | Performed by: FAMILY MEDICINE

## 2019-05-30 ASSESSMENT — MIFFLIN-ST. JEOR: SCORE: 1859.31

## 2019-05-30 ASSESSMENT — PATIENT HEALTH QUESTIONNAIRE - PHQ9: SUM OF ALL RESPONSES TO PHQ QUESTIONS 1-9: 0

## 2019-05-30 ASSESSMENT — PAIN SCALES - GENERAL: PAINLEVEL: NO PAIN (0)

## 2019-05-30 NOTE — LETTER
May 31, 2019      Fernando Wong  [address] [facility] [address]        Dear ,    We are writing to inform you of your test results.    Your test results fall within the expected range(s) or remain unchanged from previous results.  Please continue with current treatment plan.    Resulted Orders   Urinalysis w Reflex Microscopic If Positive   Result Value Ref Range    Color Urine Yellow       Comment:      Unconcentrated Microscopic Exam performed due to low specimen volume <3ml  CORRECTED ON 05/30 AT 1617: PREVIOUSLY REPORTED AS Yellow      Appearance Urine Clear     Glucose Urine Negative NEG^Negative mg/dL    Bilirubin Urine Negative NEG^Negative    Ketones Urine Negative NEG^Negative mg/dL    Specific Gravity Urine 1.025 1.000 - 1.030    Blood Urine Trace (A) NEG^Negative    pH Urine 5.5 5.0 - 9.0 pH    Protein Albumin Urine Negative NEG^Negative mg/dL    Urobilinogen Urine 0.2 0.2 - 1.0 EU/dL    Nitrite Urine Negative NEG^Negative    Leukocyte Esterase Urine Negative NEG^Negative    Source Midstream Urine    GC/Chlamydia by PCR - HI,   Result Value Ref Range    Specimen Source Endocervical     Neisseria gonorrhoreae PCR Not Detected NDET^Not Detected      Comment:      NOT DETECTED: Negative for N.gonorrhoeae genomic DNA by [facility]   real-time,reverse-transcriptase PCR. A negative result does not preclude the   presence of N.gonorrhoeae infection. The results are dependent on proper   collection,transport,processing of the specimen,and the presence of sufficient   DNA to be detected.      Chlamydia Trachomatis PCR Not Detected NDET^Not Detected      Comment:      NOTDETECTED: Negative for C.trachomatis genomic DNA by [facility]   real-time,reverse-transcriptase PCR. A negative result does not preclude the   presence of C.trachomatis infection. The results are dependent on proper   collection,transpoet,processing of specimen, and the presence of sufficient   DNA to be detected.     Urine  Microscopic   Result Value Ref Range    WBC Urine 0 - 5 OTO5^0 - 5 /HPF    RBC Urine O - 2 OTO2^O - 2 /HPF       If you have any questions or concerns, please call the clinic at the number listed above.       Sincerely,        Levon Francois MD

## 2019-05-30 NOTE — NURSING NOTE
Patient here for derm referral for spots on his back. Each year he needs a new referral for derm. Medication Reconciliation: complete.    Kateryna Banegas LPN  5/30/2019 3:06 PM

## 2019-05-31 ASSESSMENT — ENCOUNTER SYMPTOMS
EYES NEGATIVE: 1
DIZZINESS: 0
FEVER: 0
RESPIRATORY NEGATIVE: 1
CHILLS: 0
PSYCHIATRIC NEGATIVE: 1

## 2019-05-31 NOTE — PROGRESS NOTES
"Dermatologist referral  SUBJECTIVE:   Fernando Wong is a 38 year old male who presents to clinic today for the following health issues:    Patient arrives here requesting a dermatologist referral.  He is been seen by dermatology yearly for the last 3 to 4 years.  States that he did not realize he needed a dermatology referral and did make an appointment in April.  One biopsy was done.  I do not have the report available.  2 to 3 years ago he was referred because of an abnormal mole.  I cannot find that note in the chart he is also concerned about passing blood.  States he was in [place] when he had some blood at the end of the urination.  He was seen at the hospital and advised to follow-up here states that they thought it might be a kidney stone.  He did not have any flank pain groin pain lower abdominal pain.  States that when he had the episode it felt like \"a razor blade\".  Patient denies any sexual transmitted diseases.  Is interested in testing though.  He also reports no dysuria.  No frequency.  Symptoms have completely resolved.  He did not swim [place] any [place] prior to this occurring        Patient Active Problem List    Diagnosis Date Noted     Screening for skin cancer 05/30/2019     Priority: Medium     Gross hematuria 05/30/2019     Priority: Medium     Umbilical hernia without obstruction and without gangrene 12/28/2018     Priority: Medium     Lateral epicondylitis of left elbow 04/19/2017     Priority: Medium     Controlled substance agreement signed 02/15/2017     Priority: Medium     Recurrent herpes simplex 11/01/2016     Priority: Medium     Insomnia 12/31/2015     Priority: Medium     History reviewed. No pertinent past medical history.   Past Surgical History:   Procedure Laterality Date     EXTRACTION(S) DENTAL      No Comments Provided     HERNIORRHAPHY UMBILICAL N/A 12/28/2018    Procedure: Open Umbilical Hernia Repair with mesh;  Surgeon: Pranav Solano MD;  Location:  OR " "    Social History     Social History Narrative     Single, works at [employer] in [place];  No children.  Non smoker.     No current outpatient medications on file.     No Known Allergies    Review of Systems   Constitutional: Negative for chills and fever.   Eyes: Negative.    Respiratory: Negative.    Cardiovascular: Negative for chest pain.   Genitourinary: Negative.    Neurological: Negative for dizziness.   Psychiatric/Behavioral: Negative.         OBJECTIVE:     /72 (BP Location: Right arm, Patient Position: Sitting)   Pulse 64   Temp 97.9  F (36.6  C)   Resp 16   Ht 1.727 m (5' 8\")   Wt 96 kg (211 lb 9.6 oz)   BMI 32.17 kg/m    Body mass index is 32.17 kg/m .  Physical Exam   Constitutional: He appears well-developed.   HENT:   Head: Normocephalic.   Eyes: Pupils are equal, round, and reactive to light.   Pulmonary/Chest: Effort normal.   Genitourinary: Penis normal.   Neurological: He is alert.   Skin: Skin is warm.       Diagnostic Test Results:  Results for orders placed or performed in visit on 05/30/19   Urinalysis w Reflex Microscopic If Positive   Result Value Ref Range    Color Urine Yellow     Appearance Urine Clear     Glucose Urine Negative NEG^Negative mg/dL    Bilirubin Urine Negative NEG^Negative    Ketones Urine Negative NEG^Negative mg/dL    Specific Gravity Urine 1.025 1.000 - 1.030    Blood Urine Trace (A) NEG^Negative    pH Urine 5.5 5.0 - 9.0 pH    Protein Albumin Urine Negative NEG^Negative mg/dL    Urobilinogen Urine 0.2 0.2 - 1.0 EU/dL    Nitrite Urine Negative NEG^Negative    Leukocyte Esterase Urine Negative NEG^Negative    Source Midstream Urine    Urine Microscopic   Result Value Ref Range    WBC Urine 0 - 5 OTO5^0 - 5 /HPF    RBC Urine O - 2 OTO2^O - 2 /HPF   GC/Chlamydia by PCR - [facility]   Result Value Ref Range    Specimen Source Endocervical     Neisseria gonorrhoreae PCR Not Detected NDET^Not Detected    Chlamydia Trachomatis PCR Not Detected NDET^Not Detected "       ASSESSMENT/PLAN:         1. Screening for skin cancer  Referral done.  I did discuss with him that screening certainly could be done here with his PCP.  Also discussed the recommendations by [place] preventive services task force.  - DERMATOLOGY REFERRAL  - Urine Microscopic    2. Gross hematuria  Resolved.  Lab will be sent out.  - Urinalysis w Reflex Microscopic If Positive  - GC/Chlamydia by PCR - [facility],      Levon Francois MD  [facility] AND [facility]

## 2019-06-25 ENCOUNTER — OFFICE VISIT (OUTPATIENT)
Dept: FAMILY MEDICINE | Facility: OTHER | Age: 39
End: 2019-06-25
Attending: NURSE PRACTITIONER
Payer: COMMERCIAL

## 2019-06-25 ENCOUNTER — HOSPITAL ENCOUNTER (OUTPATIENT)
Dept: GENERAL RADIOLOGY | Facility: OTHER | Age: 39
Discharge: HOME | End: 2019-06-25
Attending: NURSE PRACTITIONER | Admitting: NURSE PRACTITIONER
Payer: COMMERCIAL

## 2019-06-25 VITALS
TEMPERATURE: 97.7 F | WEIGHT: 210.38 LBS | SYSTOLIC BLOOD PRESSURE: 108 MMHG | BODY MASS INDEX: 31.89 KG/M2 | DIASTOLIC BLOOD PRESSURE: 72 MMHG | RESPIRATION RATE: 15 BRPM | HEIGHT: 68 IN | HEART RATE: 58 BPM

## 2019-06-25 DIAGNOSIS — M25.531 RIGHT WRIST PAIN: Primary | ICD-10-CM

## 2019-06-25 DIAGNOSIS — M25.531 RIGHT WRIST PAIN: ICD-10-CM

## 2019-06-25 PROCEDURE — 73110 X-RAY EXAM OF WRIST: CPT | Mod: RT

## 2019-06-25 PROCEDURE — 99214 OFFICE O/P EST MOD 30 MIN: CPT | Performed by: NURSE PRACTITIONER

## 2019-06-25 ASSESSMENT — MIFFLIN-ST. JEOR: SCORE: 1853.75

## 2019-06-25 ASSESSMENT — PAIN SCALES - GENERAL: PAINLEVEL: MILD PAIN (2)

## 2019-06-25 NOTE — PROGRESS NOTES
"HPI:    Fernando Wong is a 38 year old male who presents to clinic today for right wrist pain.  He reports that he was doing front squats and was lifting heavy weights about 2 months ago.  He rolls the  onto his fingers and up onto his chest and had immediate pain into his right wrist.  He stopped lifting at that time and allowed his wrist to rest for about 1 month.  He did use ice but no anti-inflammatories.  He denies any previous injuries.  He decided to start lifting about 3 weeks ago and again had immediate pain to his right wrist especially when his second and third fingers are dorsiflexed while lifting.  He reports he has had pain in his wrist with certain activities such as fishing, lifting heavy things and doing push-ups.  He is wondering about getting a referral for therapy    History reviewed. No pertinent past medical history.    Past Surgical History:   Procedure Laterality Date     EXTRACTION(S) DENTAL      No Comments Provided     HERNIORRHAPHY UMBILICAL N/A 12/28/2018    Procedure: Open Umbilical Hernia Repair with mesh;  Surgeon: Pranav Solano MD;  Location:  OR         No current outpatient medications on file.       No Known Allergies    ROS:  Pertinent positives and negatives are noted in HPI.    EXAM:  /72 (BP Location: Right arm, Patient Position: Sitting, Cuff Size: Adult Regular)   Pulse 58   Temp 97.7  F (36.5  C) (Tympanic)   Resp 15   Ht 1.727 m (5' 8\")   Wt 95.4 kg (210 lb 6 oz)   BMI 31.99 kg/m    General appearance: well appearing male in no acute distress  Musculoskeletal: Normal range of motion of right wrist both passive and active.  He does have increased pain on the dorsal aspect of right wrist on ulnar side with dorsiflexion specifically when resistance is applied.  He has normal strength.  He is able to dorsiflex his wrist to 90 degrees will fingers are resting without any pain.  When he lifts his second and third finger and has pressure against the so " he dorsiflex he has a stop at 45 degrees due to increased pain.  Dermatological: no rashes or lesions  Psychological: normal affect, alert and pleasant  Xray: xray independently reviewed and no acute fracture appreciated; pending radiology over-read    ASSESSMENT AND PLAN:    1. Right wrist pain      Right wrist pain likely related to strain and/or sprain from weightlifting.  Wrist x-ray is negative.  He has done ice and rest without significant improvement.  At this time recommend anti-inflammatories twice daily with food.  Referred him to occupational therapy for further evaluation and management.  He will follow-up in clinic as needed.      Sarai Solano..................6/25/2019 8:55 AM      This document was prepared using voice generated software.  While every attempt was made for accuracy, grammatical errors may exist.

## 2019-06-25 NOTE — NURSING NOTE
Patient presents to clinic today for right wrist injury occurring about two months ago. He states he was lifting weights and strained right wrist. He would like to go to PT.     No LMP for male patient.  Medication Reconciliation: complete    Renetta Betts LPN  6/25/2019 8:51 AM

## 2019-06-27 ENCOUNTER — TRANSFERRED RECORDS (OUTPATIENT)
Dept: HEALTH INFORMATION MANAGEMENT | Facility: OTHER | Age: 39
End: 2019-06-27

## 2019-08-04 ENCOUNTER — OFFICE VISIT (OUTPATIENT)
Dept: FAMILY MEDICINE | Facility: OTHER | Age: 39
End: 2019-08-04
Attending: NURSE PRACTITIONER
Payer: COMMERCIAL

## 2019-08-04 VITALS
HEIGHT: 69 IN | HEART RATE: 60 BPM | BODY MASS INDEX: 32.07 KG/M2 | OXYGEN SATURATION: 97 % | WEIGHT: 216.5 LBS | RESPIRATION RATE: 17 BRPM | TEMPERATURE: 98 F | DIASTOLIC BLOOD PRESSURE: 76 MMHG | SYSTOLIC BLOOD PRESSURE: 134 MMHG

## 2019-08-04 DIAGNOSIS — B00.9 HERPES SIMPLEX TYPE 1 INFECTION: Primary | ICD-10-CM

## 2019-08-04 PROCEDURE — 99214 OFFICE O/P EST MOD 30 MIN: CPT | Performed by: NURSE PRACTITIONER

## 2019-08-04 RX ORDER — VALACYCLOVIR HYDROCHLORIDE 1 G/1
1000 TABLET, FILM COATED ORAL 3 TIMES DAILY
COMMUNITY
End: 2020-10-22

## 2019-08-04 RX ORDER — VALACYCLOVIR HYDROCHLORIDE 1 G/1
1000 TABLET, FILM COATED ORAL 3 TIMES DAILY
Qty: 21 TABLET | Refills: 0 | Status: SHIPPED | OUTPATIENT
Start: 2019-08-04 | End: 2020-03-09

## 2019-08-04 ASSESSMENT — ENCOUNTER SYMPTOMS
EYE REDNESS: 0
RESPIRATORY NEGATIVE: 1
EYE DISCHARGE: 0
MUSCULOSKELETAL NEGATIVE: 1
ACTIVITY CHANGE: 0
FEVER: 0
APPETITE CHANGE: 0
CARDIOVASCULAR NEGATIVE: 1
EYE PAIN: 0
GASTROINTESTINAL NEGATIVE: 1
FATIGUE: 1
CHILLS: 0
WOUND: 1

## 2019-08-04 ASSESSMENT — PAIN SCALES - GENERAL: PAINLEVEL: MODERATE PAIN (5)

## 2019-08-04 ASSESSMENT — MIFFLIN-ST. JEOR: SCORE: 1897.42

## 2019-08-04 NOTE — PATIENT INSTRUCTIONS
So I treated this with valacyclovir 1 gram three times per day for 7 days.   Also avoid the sun, use good sun block regularly, since this is a trigger.   As we discussed you might want to consider taking L-Lysine or Lysine 1 gram twice a day regularly, it can reduce the incidence of outbreaks.     Watch for progression to the eye, if it gets close you should see an eye doctor.

## 2019-08-04 NOTE — NURSING NOTE
"Chief Complaint   Patient presents with     Derm Problem     Patient is here for an outbreak on his face that is reoccurring. Patient is asking for a refill on a prescription that he uses to control it. He states that this outbreak started around 11pm. He is having pain on the left side of his face and back of his head. Patient states that it feels as if someone is pushing on his eyeball from behind. Patient denies taking anything for pain.       Initial BP (!) 140/80   Pulse 60   Temp 98  F (36.7  C) (Tympanic)   Resp 17   Ht 1.753 m (5' 9\")   Wt 98.2 kg (216 lb 8 oz)   SpO2 97%   BMI 31.97 kg/m   Estimated body mass index is 31.97 kg/m  as calculated from the following:    Height as of this encounter: 1.753 m (5' 9\").    Weight as of this encounter: 98.2 kg (216 lb 8 oz).  Medication Reconciliation: complete    Elvia Goel LPN  "

## 2019-08-04 NOTE — PROGRESS NOTES
"SUBJECTIVE:   Fernando Wong is a 38 year old male presenting with a chief complaint of   Chief Complaint   Patient presents with     Derm Problem       He is an established patient of [facility].    Recurrent HSV 1 - Left forehead. Hx of issue with his eye in the past, reports initial symptoms occur in \"back of eye\" typically.   Was fishing and wind kept blowing hat off, resulting in sun burn.  Reports reoccurrences about 1 per 2-3 years usually, but has had as many as 2 in one year.     Review of Systems   Constitutional: Positive for fatigue. Negative for activity change, appetite change, chills and fever.   HENT: Negative.    Eyes: Negative for pain, discharge, redness and visual disturbance.        Reports initial symptom is some discomfort at back of his eye, this is typical for him.  Reports no new change in vision. Had some vision change following 1 episode that impacted his eye.    Respiratory: Negative.    Cardiovascular: Negative.    Gastrointestinal: Negative.    Genitourinary: Negative.    Musculoskeletal: Negative.    Skin: Positive for wound.        Noted initial symptoms about 11:00 PM last night. Skin eruption was there by early AM hours, approx 0400.        History reviewed. No pertinent past medical history.  Family History   Problem Relation Age of Onset     Cancer Maternal Grandfather 50        Cancer,colon cancer; skin cancer     Current Outpatient Medications   Medication Sig Dispense Refill     valACYclovir (VALTREX) 1000 mg tablet Take 1,000 mg by mouth 3 times daily       valACYclovir (VALTREX) 1000 mg tablet Take 1 tablet (1,000 mg) by mouth 3 times daily for 7 days 21 tablet 0     Social History     Tobacco Use     Smoking status: Never Smoker     Smokeless tobacco: Never Used   Substance Use Topics     Alcohol use: Yes     Comment: Less than 1 per month       OBJECTIVE  /76   Pulse 60   Temp 98  F (36.7  C) (Tympanic)   Resp 17   Ht 1.753 m (5' 9\")   Wt 98.2 kg (216 lb 8 oz)   " SpO2 97%   BMI 31.97 kg/m      Physical Exam   Constitutional: He is oriented to person, place, and time. He appears well-developed and well-nourished. No distress.   Eyes: Pupils are equal, round, and reactive to light. Conjunctivae, EOM and lids are normal.   Cardiovascular: Normal rate, regular rhythm and normal heart sounds.   Pulmonary/Chest: Effort normal and breath sounds normal.   Neurological: He is alert and oriented to person, place, and time.   Skin: Rash noted. He is not diaphoretic. There is erythema.   Vesicular rash of left forehead into scalp. No lesions near eye. Overall area of outbreak approximately 5 x 7 cm.    Psychiatric: He has a normal mood and affect. His behavior is normal. Judgment and thought content normal.   Nursing note and vitals reviewed.      Labs:  No results found for this or any previous visit (from the past 24 hour(s)).    X-Ray was not done.    ASSESSMENT:      ICD-10-CM    1. Herpes simplex type 1 infection B00.9 valACYclovir (VALTREX) 1000 mg tablet        Medical Decision Making:    Differential Diagnosis:  Rash: Herpes simplex    Serious Comorbid Conditions:  Adult:  None    PLAN:    Rash:  Antiviral Valacyclovir 1 gram 3 times per day for 7 days.   Tylenol or Ibuprofen for pain, fever  Discussed trial of L-Lysine 1 gram twice a day to reduce frequency.   Also consider daily acyclovir should frequency increase.  Discussed importance of seeing ophthalmologist should leas ions near eye.   Reviewed additional self care measures and infection control.  Pt is in agreement with plan and has no further questions.       Followup:    If not improving or if condition worsens, follow up with your Primary Care Provider    Patient Instructions   So I treated this with valacyclovir 1 gram three times per day for 7 days.   Also avoid the sun, use good sun block regularly, since this is a trigger.   As we discussed you might want to consider taking L-Lysine or Lysine 1 gram twice a day  regularly, it can reduce the incidence of outbreaks.     Watch for progression to the eye, if it gets close you should see an eye doctor.

## 2019-09-12 ENCOUNTER — OFFICE VISIT (OUTPATIENT)
Dept: FAMILY MEDICINE | Facility: OTHER | Age: 39
End: 2019-09-12
Attending: NURSE PRACTITIONER
Payer: COMMERCIAL

## 2019-09-12 ENCOUNTER — TRANSFERRED RECORDS (OUTPATIENT)
Dept: HEALTH INFORMATION MANAGEMENT | Facility: OTHER | Age: 39
End: 2019-09-12

## 2019-09-12 VITALS
WEIGHT: 217.13 LBS | HEART RATE: 58 BPM | RESPIRATION RATE: 15 BRPM | DIASTOLIC BLOOD PRESSURE: 82 MMHG | HEIGHT: 69 IN | TEMPERATURE: 97.8 F | SYSTOLIC BLOOD PRESSURE: 128 MMHG | OXYGEN SATURATION: 99 % | BODY MASS INDEX: 32.16 KG/M2

## 2019-09-12 DIAGNOSIS — G89.29 CHRONIC PAIN OF RIGHT WRIST: Primary | ICD-10-CM

## 2019-09-12 DIAGNOSIS — M25.531 CHRONIC PAIN OF RIGHT WRIST: Primary | ICD-10-CM

## 2019-09-12 DIAGNOSIS — M25.561 CHRONIC PAIN OF RIGHT KNEE: ICD-10-CM

## 2019-09-12 DIAGNOSIS — G89.29 CHRONIC PAIN OF RIGHT KNEE: ICD-10-CM

## 2019-09-12 PROCEDURE — 99213 OFFICE O/P EST LOW 20 MIN: CPT | Performed by: NURSE PRACTITIONER

## 2019-09-12 ASSESSMENT — MIFFLIN-ST. JEOR: SCORE: 1900.25

## 2019-09-12 ASSESSMENT — PAIN SCALES - GENERAL: PAINLEVEL: MILD PAIN (2)

## 2019-09-12 NOTE — PROGRESS NOTES
HPI:    Fernando Wong is a 38 year old male who presents to clinic today for follow-up on right wrist pain.  He was seen back in June of this year for right wrist pain that developed while he was lifting weights.  He has been working with occupational therapy and reports that most of the pain and swelling has resolved but he still will have occasional pain with certain movements.  His occupational therapist is requesting an MRI for more definitive answers.    He also reports that he has had some intermittent right knee pain that developed earlier this year when he was doing weighted lunges.  At that time he was seen by Dr. Tucker Solano for another issue and was referred to physical therapy.  For various reasons this fell through and he would like to get a new referral for physical therapy.    No past medical history on file.    Past Surgical History:   Procedure Laterality Date     EXTRACTION(S) DENTAL      No Comments Provided     HERNIORRHAPHY UMBILICAL N/A 12/28/2018    Procedure: Open Umbilical Hernia Repair with mesh;  Surgeon: Pranav Solano MD;  Location: [facility] OR       Family History   Problem Relation Age of Onset     Cancer Maternal Grandfather 50        Cancer,colon cancer; skin cancer       Social History     Socioeconomic History     Marital status: Single     Spouse name: Not on file     Number of children: Not on file     Years of education: Not on file     Highest education level: Not on file   Occupational History     Not on file   Social Needs     Financial resource strain: Not on file     Food insecurity:     Worry: Not on file     Inability: Not on file     Transportation needs:     Medical: Not on file     Non-medical: Not on file   Tobacco Use     Smoking status: Never Smoker     Smokeless tobacco: Never Used   Substance and Sexual Activity     Alcohol use: Yes     Comment: Less than 1 per month     Drug use: Never     Sexual activity: Not Currently     Comment: doctor to address    Lifestyle  "    Physical activity:     Days per week: Not on file     Minutes per session: Not on file     Stress: Not on file   Relationships     Social connections:     Talks on phone: Not on file     Gets together: Not on file     Attends Ep[employer] service: Not on file     Active member of club or organization: Not on file     Attends meetings of clubs or organizations: Not on file     Relationship status: Not on file     Intimate partner violence:     Fear of current or ex partner: Not on file     Emotionally abused: Not on file     Physically abused: Not on file     Forced sexual activity: Not on file   Other Topics Concern     Parent/sibling w/ CABG, MI or angioplasty before 65F 55M? Not Asked   Social History Narrative     Single, works at [employer] in [employer];  No children.  Non smoker.       Current Outpatient Medications   Medication Sig Dispense Refill     valACYclovir (VALTREX) 1000 mg tablet Take 1,000 mg by mouth 3 times daily       valACYclovir (VALTREX) 1000 mg tablet Take 1 tablet (1,000 mg) by mouth 3 times daily for 7 days 21 tablet 0       No Known Allergies    ROS:  Pertinent positives and negatives are noted in HPI.    EXAM:  /82 (BP Location: Right arm, Patient Position: Sitting, Cuff Size: Adult Regular)   Pulse 58   Temp 97.8  F (36.6  C) (Tympanic)   Resp 15   Ht 1.753 m (5' 9\")   Wt 98.5 kg (217 lb 2 oz)   SpO2 99%   BMI 32.06 kg/m    General appearance: well appearing male, in no acute distress  Musculoskeletal: Normal range of motion of right wrist, wrist braces in place.  Dermatological: no rashes or lesions  Psychological: normal affect, alert and pleasant    ASSESSMENT AND PLAN:    1. Chronic pain of right wrist    2. Chronic pain of right knee      MRI of right wrist was ordered per recommendations from occupational therapist.  We will follow-up with these results when available.  Next    Referral to physical therapy for chronic right knee pain.  Follow-up as needed.      Sarai Solano, MARILU " CNP..................9/12/2019 2:08 PM      This document was prepared using voice generated software.  While every attempt was made for accuracy, grammatical errors may exist.

## 2019-09-12 NOTE — NURSING NOTE
Patient presents to clinic today for orders for MRI. [facility] PT recommended he get one.     No LMP for male patient.  Medication Reconciliation: complete    Renetta Betts LPN  9/12/2019 1:59 PM

## 2019-09-24 ENCOUNTER — HOSPITAL ENCOUNTER (OUTPATIENT)
Dept: MRI IMAGING | Facility: OTHER | Age: 39
Discharge: HOME | End: 2019-09-24
Attending: NURSE PRACTITIONER | Admitting: NURSE PRACTITIONER
Payer: COMMERCIAL

## 2019-09-24 ENCOUNTER — TRANSFERRED RECORDS (OUTPATIENT)
Dept: HEALTH INFORMATION MANAGEMENT | Facility: OTHER | Age: 39
End: 2019-09-24

## 2019-09-24 DIAGNOSIS — G89.29 CHRONIC PAIN OF RIGHT WRIST: ICD-10-CM

## 2019-09-24 DIAGNOSIS — M25.531 CHRONIC PAIN OF RIGHT WRIST: ICD-10-CM

## 2019-09-24 PROCEDURE — 73221 MRI JOINT UPR EXTREM W/O DYE: CPT | Mod: RT

## 2019-09-26 DIAGNOSIS — M25.531 CHRONIC PAIN OF RIGHT WRIST: Primary | ICD-10-CM

## 2019-09-26 DIAGNOSIS — T14.8XXA TENDON TEAR: ICD-10-CM

## 2019-09-26 DIAGNOSIS — G89.29 CHRONIC PAIN OF RIGHT WRIST: Primary | ICD-10-CM

## 2019-10-01 ENCOUNTER — TELEPHONE (OUTPATIENT)
Dept: FAMILY MEDICINE | Facility: OTHER | Age: 39
End: 2019-10-01

## 2019-10-01 DIAGNOSIS — M25.531 CHRONIC PAIN OF RIGHT WRIST: Primary | ICD-10-CM

## 2019-10-01 DIAGNOSIS — G89.29 CHRONIC PAIN OF RIGHT WRIST: Primary | ICD-10-CM

## 2019-10-01 DIAGNOSIS — T14.8XXA TENDON TEAR: ICD-10-CM

## 2019-10-01 NOTE — TELEPHONE ENCOUNTER
Patient would like to see either  or Omari through [facility]. All he would need is a referral placed for insurance purposes.

## 2019-10-01 NOTE — TELEPHONE ENCOUNTER
After verifying pts name and date of birth with pt, pt notified of message below and states understanding.  Taina Solano LPN

## 2019-11-08 ENCOUNTER — HEALTH MAINTENANCE LETTER (OUTPATIENT)
Age: 39
End: 2019-11-08

## 2019-11-25 ENCOUNTER — TRANSFERRED RECORDS (OUTPATIENT)
Dept: HEALTH INFORMATION MANAGEMENT | Facility: OTHER | Age: 39
End: 2019-11-25

## 2020-03-05 DIAGNOSIS — B00.9 HERPES SIMPLEX TYPE 1 INFECTION: Primary | ICD-10-CM

## 2020-03-09 NOTE — TELEPHONE ENCOUNTER
[id number] in [employer] [facility] [place] sent Rx request for the following:    From pharmacy: DX Code Needed  PLEASE SEND NEW RX. TAKE ONE TAB PO TID FOR 7 DAYS. LF 08/04/2019. THANKS, SONIDO.      VALACYCLOVIR HCL 1 GRAM TABLET      Sig: TAKE 1 TABLET BY MOUTH 3 TIMES DAILY FOR 7 DAYS     Last Prescription Date:  8/4/19  Last Fill Qty/Refills:         21, R-0 (End: 9/12/19)    Last Office Visit:              9/12/19  Future Office visit:           None.  Routing refill request to provider for review/approval because:  Antivirals for Herpes Protocol [id number]/09/2020 02:09 PM   Normal serum creatinine on file in past 12 months     Unable to complete prescription refill per RN Medication Refill Policy. Katrin Pham RN .............. 3/9/2020  2:12 PM

## 2020-03-10 RX ORDER — VALACYCLOVIR HYDROCHLORIDE 1 G/1
TABLET, FILM COATED ORAL
Qty: 21 TABLET | Refills: 0 | Status: SHIPPED | OUTPATIENT
Start: 2020-03-10

## 2020-07-08 ENCOUNTER — TELEPHONE (OUTPATIENT)
Dept: FAMILY MEDICINE | Facility: OTHER | Age: 40
End: 2020-07-08

## 2020-07-08 DIAGNOSIS — Z12.83 SCREENING FOR SKIN CANCER: Primary | ICD-10-CM

## 2020-07-08 NOTE — TELEPHONE ENCOUNTER
Patient needs referral for his dermatologist - he says he gets one every year. Please call to advise.   Rekha Granados on 7/8/2020 at 4:08 PM

## 2020-07-08 NOTE — TELEPHONE ENCOUNTER
Talked with patient and he has an appointment already scheduled for the end of July with Shemar Tang in [place]. Referral is pending if appropriate.  Katina Rodriguez, LALITHAN, LPN  7/8/2020  4:48 PM

## 2020-07-22 ENCOUNTER — TRANSFERRED RECORDS (OUTPATIENT)
Dept: HEALTH INFORMATION MANAGEMENT | Facility: OTHER | Age: 40
End: 2020-07-22

## 2020-10-22 ENCOUNTER — HOSPITAL ENCOUNTER (OUTPATIENT)
Dept: GENERAL RADIOLOGY | Facility: OTHER | Age: 40
End: 2020-10-22
Attending: FAMILY MEDICINE
Payer: COMMERCIAL

## 2020-10-22 ENCOUNTER — OFFICE VISIT (OUTPATIENT)
Dept: FAMILY MEDICINE | Facility: OTHER | Age: 40
End: 2020-10-22
Attending: FAMILY MEDICINE
Payer: COMMERCIAL

## 2020-10-22 VITALS
SYSTOLIC BLOOD PRESSURE: 130 MMHG | TEMPERATURE: 97.7 F | HEART RATE: 66 BPM | OXYGEN SATURATION: 99 % | RESPIRATION RATE: 16 BRPM | BODY MASS INDEX: 31.55 KG/M2 | HEIGHT: 69 IN | WEIGHT: 213 LBS | DIASTOLIC BLOOD PRESSURE: 88 MMHG

## 2020-10-22 DIAGNOSIS — M25.512 ACUTE PAIN OF LEFT SHOULDER: ICD-10-CM

## 2020-10-22 DIAGNOSIS — M25.512 ACUTE PAIN OF LEFT SHOULDER: Primary | ICD-10-CM

## 2020-10-22 PROCEDURE — 99213 OFFICE O/P EST LOW 20 MIN: CPT | Performed by: FAMILY MEDICINE

## 2020-10-22 PROCEDURE — 73030 X-RAY EXAM OF SHOULDER: CPT | Mod: LT

## 2020-10-22 RX ORDER — KETOROLAC TROMETHAMINE 10 MG/1
10 TABLET, FILM COATED ORAL EVERY 8 HOURS PRN
Qty: 15 TABLET | Refills: 0 | Status: SHIPPED | OUTPATIENT
Start: 2020-10-22

## 2020-10-22 SDOH — HEALTH STABILITY: MENTAL HEALTH: HOW MANY STANDARD DRINKS CONTAINING ALCOHOL DO YOU HAVE ON A TYPICAL DAY?: NOT ASKED

## 2020-10-22 SDOH — HEALTH STABILITY: MENTAL HEALTH: HOW OFTEN DO YOU HAVE A DRINK CONTAINING ALCOHOL?: MONTHLY OR LESS

## 2020-10-22 SDOH — HEALTH STABILITY: MENTAL HEALTH: HOW OFTEN DO YOU HAVE 6 OR MORE DRINKS ON ONE OCCASION?: NOT ASKED

## 2020-10-22 ASSESSMENT — MIFFLIN-ST. JEOR: SCORE: 1871.54

## 2020-10-22 ASSESSMENT — PAIN SCALES - GENERAL: PAINLEVEL: EXTREME PAIN (9)

## 2020-10-22 NOTE — PROGRESS NOTES
"  SUBJECTIVE:   Fernando Wong is a 40 year old male who presents to clinic today for the following health issues:    HPI  Left Shoulder Pain:  Present for the past month.  Had flu shot on 9/29.  Had normal \"soreness\" but by the end of the day could hardly move his arm to remove his shirt.  Pain is worse with reaching across his body and lifting arm above shoulder level.  No other injury or inciting event.  Feels weak when trying to lift groceries.  Can get tingling into his hand with certain movements.  Has tried ice, and heat, rest, and advil without significant improvement.  Has never had symptoms like this before.  No history of previous injury or surgery.    History reviewed. No pertinent past medical history.   Past Surgical History:   Procedure Laterality Date     EXTRACTION(S) DENTAL      No Comments Provided     HERNIORRHAPHY UMBILICAL N/A 12/28/2018    Procedure: Open Umbilical Hernia Repair with mesh;  Surgeon: Pranav Solano MD;  Location:  OR     Family History   Problem Relation Age of Onset     Cancer Maternal Grandfather 50        Cancer,colon cancer; skin cancer     Social History     Tobacco Use     Smoking status: Never Smoker     Smokeless tobacco: Never Used   Substance Use Topics     Alcohol use: Yes     Frequency: Monthly or less     Comment: Less than 1 per month     Current Outpatient Medications   Medication Sig Dispense Refill     ketorolac (TORADOL) 10 MG tablet Take 1 tablet (10 mg) by mouth every 8 hours as needed for moderate pain 15 tablet 0     valACYclovir (VALTREX) 1000 mg tablet Take 1 tablet by mouth 3 times daily for 7 days. Dx. Code: B00.9. 21 tablet 0     No Known Allergies    Review of Systems   Constitutional: Negative for chills and fever.      OBJECTIVE:     /88   Pulse 66   Temp 97.7  F (36.5  C) (Tympanic)   Resp 16   Ht 1.753 m (5' 9\")   Wt 96.6 kg (213 lb)   SpO2 99%   BMI 31.45 kg/m    Body mass index is 31.45 kg/m .  Physical Exam  Constitutional:  "      General: He is not in acute distress.     Appearance: Normal appearance. He is not ill-appearing.   Musculoskeletal:      Comments: No deformity of left shoulder.  Tenderness to palpation of AC joint.   Neurological:      Mental Status: He is alert.      Comments: Normal upper extremity strength bilaterally.  Across body adduction, Hawkin's, and Lift off tests positive.   Psychiatric:         Mood and Affect: Mood normal.     Diagnostic Test Results:  XR Shoulder LT: Normal left shoulder.    ASSESSMENT/PLAN:     1. Acute pain of left shoulder  XR negative for bony abnormalities.  Encouraged conservative management with ice/heat and OTC pain relief medications as needed.  Will provide short course of Toradol.  Counseled on potential adverse effects and appropriate use.  Referral to physical therapy.  Follow-up if symptoms worsening or failing to improve.  - PHYSICAL THERAPY REFERRAL; Future  - ketorolac (TORADOL) 10 MG tablet; Take 1 tablet (10 mg) by mouth every 8 hours as needed for moderate pain  Dispense: 15 tablet; Refill: 0      DO MICHAEL Carranza [facility] AND [facility]

## 2020-10-22 NOTE — NURSING NOTE
"Chief Complaint   Patient presents with     Shoulder Pain     Left     He received the flu shot 9/29/20 at [facility] and his left arm has been hurting since then, he has pain when lifting left arm.    Initial /88   Pulse 66   Temp 97.7  F (36.5  C) (Tympanic)   Resp 16   Ht 1.753 m (5' 9\")   Wt 96.6 kg (213 lb)   SpO2 99%   BMI 31.45 kg/m   Estimated body mass index is 31.45 kg/m  as calculated from the following:    Height as of this encounter: 1.753 m (5' 9\").    Weight as of this encounter: 96.6 kg (213 lb).    Medication Reconciliation: complete      Norma J. Gosselin, LPN  "

## 2020-10-27 ASSESSMENT — ENCOUNTER SYMPTOMS
FEVER: 0
CHILLS: 0

## 2020-11-10 ENCOUNTER — HOSPITAL ENCOUNTER (OUTPATIENT)
Dept: PHYSICAL THERAPY | Facility: OTHER | Age: 40
Setting detail: THERAPIES SERIES
End: 2020-11-10
Attending: FAMILY MEDICINE
Payer: COMMERCIAL

## 2020-11-10 DIAGNOSIS — M25.512 ACUTE PAIN OF LEFT SHOULDER: ICD-10-CM

## 2020-11-10 PROCEDURE — 97110 THERAPEUTIC EXERCISES: CPT | Mod: GP

## 2020-11-10 PROCEDURE — 97161 PT EVAL LOW COMPLEX 20 MIN: CPT | Mod: GP

## 2020-11-11 NOTE — PROGRESS NOTES
11/10/20 1400   General Information   Type of Visit Initial OP Ortho PT Evaluation   Start of Care Date 11/10/20   Referring Physician Nola Delgado D.O.    Patient/Family Goals Statement to be able to use the left shoulder w/o limit   Orders Evaluate and Treat   Date of Order 10/22/20   Certification Required? No   Medical Diagnosis Acute pain of left shoulder M25.512   Surgical/Medical history reviewed Yes   Precautions/Limitations no known precautions/limitations   Body Part(s)   Body Part(s) Shoulder   Presentation and Etiology   Pertinent history of current problem (include personal factors and/or comorbidities that impact the POC) Patient is a 39 y/o male whom presents to PT 3 weeks S/P developing acute left shoulder pain. He reports no specific injury to the shoulder and has no previous history of shoulder dysfunction. He does report that he recieved an influenza injection just before the onset of pain. Reports the shoulder pain is decreasing now. He has pain laying on the left side. No pain at rest.    Impairments A. Pain;D. Decreased ROM;E. Decreased flexibility;F. Decreased strength and endurance   Functional Limitations perform required work activities;perform desired leisure / sports activities   Symptom Location Anterior to lateral left shoulder.    How/Where did it occur Other  (After injection)   Onset date of current episode/exacerbation 09/29/20   Chronicity New   Pain rating (0-10 point scale) Best (/10);Worst (/10)   Best (/10) 0/10   Worst (/10) 8/10  short duration   Pain quality A. Sharp;C. Aching;F. Stabbing   Frequency of pain/symptoms C. With activity   Pain/symptoms are: The same all the time   Pain/symptoms exacerbated by C. Lifting;G. Certain positions   Pain/symptoms eased by C. Rest;E. Changing positions   Progression of symptoms since onset: Improved   Prior Level of Function   Prior Level of Function-Mobility NML   Prior Level of Function-ADLs NML   Current Level of Function    Current Community Support Family/friend caregiver   Patient role/employment history A. Employed   Employment Comments [employer]    Living [facility]   Home/community accessibility NML   Fall Risk Screen   Fall screen completed by PT   Have you fallen 2 or more times in the past year? No   Have you fallen and had an injury in the past year? No   Is patient a fall risk? No   Abuse Screen (yes response referral indicated)   Feels Unsafe at Home or Work/School no   Feels Threatened by Someone no   Does Anyone Try to Keep You From Having Contact with Others or Doing Things Outside Your Home? no   Physical Signs of Abuse Present no   Shoulder Objective Findings   Side (if bilateral, select both right and left) Left   Observation No deformity or swelling   Integumentary  NML   Posture NML   Cervical Screen (ROM, quadrant)  NML   Thoracic Mobility Screen NML   Shoulder ROM Comment AROM Flexion significantly limited.    Scapulothoracic Rhythm Slight scapular elevation   Pec Minor (supine) Flexibility minimal tightness    Shoulder Flexibility Comments Only limited in AROM flexion   Neer's Test NEG   Barker-Beau Test NEG   Bursa Test POS   Day's Test Neg   Crossover Test Mild POS   Palpation Minimal tenderness right anterior shoulder non specific.    Left Shoulder Flexion AROM 120   Left Shoulder Flexion PROM 165   Left Shoulder Abduction AROM 170   Left Shoulder Abduction PROM NML   Left Shoulder ER AROM easily behind the neck   Left Shoulder ER PROM NML   Left Shoulder IR AROM easily to mid lumbar.   Left Shoulder IR PROM NML   Left Shoulder Flexion Strength 4-   Left Shoulder Abduction Strength 5   Left Shoulder ER Strength 5   Left Shoulder IR Strength 5   Left Shoulder Extension Strength 5   Left Mid Trapezius Strength 5   Left Lower Trapezius Strength 4+   Planned Therapy Interventions   Planned Therapy Interventions joint mobilization;manual  therapy;ROM;strengthening;stretching   Planned Modality Interventions   Planned Modality Interventions Ultrasound;Electrical stimulation;Hot packs;Cryotherapy   Clinical Impression   Criteria for Skilled Therapeutic Interventions Met yes, treatment indicated   PT Diagnosis Impaired mobility of UE due to likely bursitis   Influenced by the following impairments Pain with use, limited mobility, weakness   Functional limitations due to impairments Difficulty with overhead grooming, hanging clothes, reaching to overhead shelves, dressing   Clinical Presentation Stable/Uncomplicated   Clinical Decision Making (Complexity) Low complexity   Therapy Frequency 2 times/Week   Predicted Duration of Therapy Intervention (days/wks) 6-8 weeks   Risk & Benefits of therapy have been explained Yes   Patient, Family & other staff in agreement with plan of care Yes   Clinical Impression Comments Appears to be aquired limited mobility due to disuse/pain avoidance   Education Assessment   Preferred Learning Style Listening;Reading;Demonstration;Pictures/video   Barriers to Learning No barriers   ORTHO GOALS   PT Ortho Eval Goals 1;2;3   Ortho Goal 1   Goal Identifier Pain   Goal Description Patient will report he is able to sleep without disturbance, dress w/o difficulty and lift items to 5# to overhead shelves w/o limit due to left shoulder pain >1/10 in 6-8 weeks   Target Date 01/10/21   Ortho Goal 2   Goal Identifier Mobility   Goal Description Patinet will demo full left shoulder flexion AROM aloowing nml dressing and the ability to complete tasks with the shoulder in overhead positions such as hanging clothes, cleaning windows etc. in 6-8 weeks   Target Date 01/10/21   Ortho Goal 3   Goal Identifier Strength   Goal Description Patient will demo MMT grade 5/5 strength in all left RC and scapular stabilizer muscles supporting use of the shoulder in elevated positions with hand tools, climbing ladders, etc in 6-8 weeks   Target Date  01/10/21   Total Evaluation Time   PT Eval, Low Complexity Minutes (01920) 25

## 2020-11-12 ENCOUNTER — HOSPITAL ENCOUNTER (OUTPATIENT)
Dept: PHYSICAL THERAPY | Facility: OTHER | Age: 40
Setting detail: THERAPIES SERIES
End: 2020-11-12
Attending: FAMILY MEDICINE
Payer: COMMERCIAL

## 2020-11-12 PROCEDURE — 97110 THERAPEUTIC EXERCISES: CPT | Mod: GP

## 2020-11-12 PROCEDURE — 97140 MANUAL THERAPY 1/> REGIONS: CPT | Mod: GP

## 2020-11-12 PROCEDURE — 97035 APP MDLTY 1+ULTRASOUND EA 15: CPT | Mod: GP

## 2020-11-17 ENCOUNTER — HOSPITAL ENCOUNTER (OUTPATIENT)
Dept: PHYSICAL THERAPY | Facility: OTHER | Age: 40
Setting detail: THERAPIES SERIES
End: 2020-11-17
Attending: FAMILY MEDICINE
Payer: COMMERCIAL

## 2020-11-17 PROCEDURE — 97140 MANUAL THERAPY 1/> REGIONS: CPT | Mod: GP

## 2020-11-17 PROCEDURE — 97035 APP MDLTY 1+ULTRASOUND EA 15: CPT | Mod: GP

## 2020-11-19 ENCOUNTER — HOSPITAL ENCOUNTER (OUTPATIENT)
Dept: PHYSICAL THERAPY | Facility: OTHER | Age: 40
Setting detail: THERAPIES SERIES
End: 2020-11-19
Attending: FAMILY MEDICINE
Payer: COMMERCIAL

## 2020-11-19 PROCEDURE — 97140 MANUAL THERAPY 1/> REGIONS: CPT | Mod: GP

## 2020-11-19 PROCEDURE — 97035 APP MDLTY 1+ULTRASOUND EA 15: CPT | Mod: GP

## 2020-11-27 ENCOUNTER — HOSPITAL ENCOUNTER (OUTPATIENT)
Dept: PHYSICAL THERAPY | Facility: OTHER | Age: 40
Setting detail: THERAPIES SERIES
End: 2020-11-27
Attending: FAMILY MEDICINE
Payer: COMMERCIAL

## 2020-11-27 PROCEDURE — 97035 APP MDLTY 1+ULTRASOUND EA 15: CPT | Mod: GP

## 2020-11-27 PROCEDURE — 97110 THERAPEUTIC EXERCISES: CPT | Mod: GP

## 2020-11-27 PROCEDURE — 97140 MANUAL THERAPY 1/> REGIONS: CPT | Mod: GP

## 2020-12-01 ENCOUNTER — HOSPITAL ENCOUNTER (OUTPATIENT)
Dept: PHYSICAL THERAPY | Facility: OTHER | Age: 40
Setting detail: THERAPIES SERIES
End: 2020-12-01
Attending: FAMILY MEDICINE
Payer: COMMERCIAL

## 2020-12-01 PROCEDURE — 97110 THERAPEUTIC EXERCISES: CPT | Mod: GP

## 2020-12-01 PROCEDURE — 97035 APP MDLTY 1+ULTRASOUND EA 15: CPT | Mod: GP

## 2020-12-01 PROCEDURE — 97140 MANUAL THERAPY 1/> REGIONS: CPT | Mod: GP

## 2020-12-06 ENCOUNTER — HEALTH MAINTENANCE LETTER (OUTPATIENT)
Age: 40
End: 2020-12-06

## 2020-12-29 ENCOUNTER — HOSPITAL ENCOUNTER (OUTPATIENT)
Dept: PHYSICAL THERAPY | Facility: OTHER | Age: 40
Setting detail: THERAPIES SERIES
End: 2020-12-29
Attending: FAMILY MEDICINE
Payer: COMMERCIAL

## 2020-12-29 PROCEDURE — 97140 MANUAL THERAPY 1/> REGIONS: CPT | Mod: GP

## 2020-12-29 PROCEDURE — 97110 THERAPEUTIC EXERCISES: CPT | Mod: GP

## 2020-12-29 PROCEDURE — 97035 APP MDLTY 1+ULTRASOUND EA 15: CPT | Mod: GP

## 2021-01-04 ENCOUNTER — HOSPITAL ENCOUNTER (OUTPATIENT)
Dept: PHYSICAL THERAPY | Facility: OTHER | Age: 41
Setting detail: THERAPIES SERIES
End: 2021-01-04
Attending: FAMILY MEDICINE
Payer: COMMERCIAL

## 2021-01-04 PROCEDURE — 97110 THERAPEUTIC EXERCISES: CPT | Mod: GP

## 2021-01-04 PROCEDURE — 97035 APP MDLTY 1+ULTRASOUND EA 15: CPT | Mod: GP

## 2021-01-04 PROCEDURE — 97140 MANUAL THERAPY 1/> REGIONS: CPT | Mod: GP

## 2021-01-08 ENCOUNTER — HOSPITAL ENCOUNTER (OUTPATIENT)
Dept: PHYSICAL THERAPY | Facility: OTHER | Age: 41
Setting detail: THERAPIES SERIES
End: 2021-01-08
Attending: FAMILY MEDICINE
Payer: COMMERCIAL

## 2021-01-08 PROCEDURE — 97110 THERAPEUTIC EXERCISES: CPT | Mod: GP

## 2021-01-08 PROCEDURE — 97140 MANUAL THERAPY 1/> REGIONS: CPT | Mod: GP

## 2021-01-08 NOTE — PROGRESS NOTES
Outpatient Physical Therapy Progress Note     Patient: Fernando Wong  : 1980    End Dates of Reporting Period:  2021    Referring Provider: Dr. Nola Delgado    Therapy Diagnosis: Left shoulder pain     Client Self Report: Reports he has been pretty active over the last two weeks working in his garage, etc. and as result his shoulder has actually been more sore. Continues to rate as 2-3/10 on average    Objective Measurements:  Objective Measure: Mobility  Details: AROM flexion to 143 deg with end range pain. NML abd without pain. IR/ER are nml and pain free  Objective Measure: Strength  Details: Grade 4+/5 all with minor pain with flexion. Ajith chaney will continue to report minor pain in the anterior shoulder with IROT strengthening with band. He is able to complete the full RC low load /high rep RC program  Objective Measure: Palpation  Details: Variable 1-2/4 tenderness of the left LH bicep tendon. Minor tenderness and some crepitus of the left AC joint with IROT/EROT        Goals:  Goal Identifier Pain   Goal Description Patient will report he is able to sleep without disturbance, dress w/o difficulty and lift items to 5# to overhead shelves w/o limit due to left shoulder pain >1/10 in 6-8 weeks   Target Date 01/10/21   Date Met      Progress:Progress has been slow. Patient continues to c/o variable pain up to 4-5/10 at times, A more constant 2/10 as well     Goal Identifier Mobility   Goal Description Patient will demo full left shoulder flexion AROM alowing nml dressing and the ability to complete tasks with the shoulder in overhead positions such as hanging clothes, cleaning windows etc. in 6-8 weeks   Target Date 01/10/21   Date Met      Progress: Continue goal. Good progress made. See measurements above     Goal Identifier Strength   Goal Description Patient will demo MMT grade 5/5 strength in all left RC and scapular stabilizer muscles supporting use of the shoulder in elevated positions with  hand tools, climbing ladders, etc in 6-8 weeks   Target Date 01/10/21   Date Met      Progress: Continue goal. Additional strengthening is required to restore nml shoulder stability           Progress Toward Goals:   Progress this reporting period: As above          Plan:  Continue therapy per current plan of care.    Discharge:  No

## 2021-01-18 ENCOUNTER — HOSPITAL ENCOUNTER (OUTPATIENT)
Dept: PHYSICAL THERAPY | Facility: OTHER | Age: 41
Setting detail: THERAPIES SERIES
End: 2021-01-18
Attending: FAMILY MEDICINE
Payer: COMMERCIAL

## 2021-01-18 PROCEDURE — 97140 MANUAL THERAPY 1/> REGIONS: CPT | Mod: GP

## 2021-01-18 PROCEDURE — 97110 THERAPEUTIC EXERCISES: CPT | Mod: GP

## 2021-01-21 ENCOUNTER — HOSPITAL ENCOUNTER (OUTPATIENT)
Dept: PHYSICAL THERAPY | Facility: OTHER | Age: 41
Setting detail: THERAPIES SERIES
End: 2021-01-21
Attending: FAMILY MEDICINE
Payer: COMMERCIAL

## 2021-01-21 PROCEDURE — 97110 THERAPEUTIC EXERCISES: CPT | Mod: GP

## 2021-01-21 PROCEDURE — 97140 MANUAL THERAPY 1/> REGIONS: CPT | Mod: GP

## 2021-01-26 ENCOUNTER — HOSPITAL ENCOUNTER (OUTPATIENT)
Dept: PHYSICAL THERAPY | Facility: OTHER | Age: 41
Setting detail: THERAPIES SERIES
End: 2021-01-26
Attending: FAMILY MEDICINE
Payer: COMMERCIAL

## 2021-01-26 PROCEDURE — 97140 MANUAL THERAPY 1/> REGIONS: CPT | Mod: GP

## 2021-01-26 PROCEDURE — 97110 THERAPEUTIC EXERCISES: CPT | Mod: GP

## 2021-02-03 ENCOUNTER — HOSPITAL ENCOUNTER (OUTPATIENT)
Dept: PHYSICAL THERAPY | Facility: OTHER | Age: 41
Setting detail: THERAPIES SERIES
End: 2021-02-03
Attending: FAMILY MEDICINE
Payer: COMMERCIAL

## 2021-02-03 PROCEDURE — 97110 THERAPEUTIC EXERCISES: CPT | Mod: GP

## 2021-02-03 PROCEDURE — 97140 MANUAL THERAPY 1/> REGIONS: CPT | Mod: GP

## 2021-02-17 ENCOUNTER — HOSPITAL ENCOUNTER (OUTPATIENT)
Dept: PHYSICAL THERAPY | Facility: OTHER | Age: 41
Setting detail: THERAPIES SERIES
End: 2021-02-17
Attending: FAMILY MEDICINE
Payer: COMMERCIAL

## 2021-02-17 PROCEDURE — 97110 THERAPEUTIC EXERCISES: CPT | Mod: GP

## 2021-02-24 ENCOUNTER — HOSPITAL ENCOUNTER (OUTPATIENT)
Dept: PHYSICAL THERAPY | Facility: OTHER | Age: 41
Setting detail: THERAPIES SERIES
End: 2021-02-24
Attending: FAMILY MEDICINE
Payer: COMMERCIAL

## 2021-02-24 PROCEDURE — 97140 MANUAL THERAPY 1/> REGIONS: CPT | Mod: GP

## 2021-02-24 PROCEDURE — 97110 THERAPEUTIC EXERCISES: CPT | Mod: GP

## 2021-03-09 ENCOUNTER — HOSPITAL ENCOUNTER (OUTPATIENT)
Dept: PHYSICAL THERAPY | Facility: OTHER | Age: 41
Setting detail: THERAPIES SERIES
End: 2021-03-09
Attending: FAMILY MEDICINE
Payer: COMMERCIAL

## 2021-03-09 PROCEDURE — 97110 THERAPEUTIC EXERCISES: CPT | Mod: GP

## 2021-03-09 NOTE — PROGRESS NOTES
"  Outpatient Physical Therapy Discharge Note     Patient: Fernando Wong  : 1980    Beginning/End Dates of Reporting Period:  3/9/2021    Referring Provider: Nola Delgado,     Therapy Diagnosis: Left shoulder pain, strain, biceps tendinitis     Client Self Report: Patient feels his shoulder is the best it has been for \"a long time\". He reports  \"little sore\"  in the morning.     Objective Measurements:  Objective Measure: Mobility  Details: 159 deg AROM without end range pain. Abduction, EROT and IROT are all full and equal to the right side.   Objective Measure: Strength  Details: MMT shows grade 4+ to 5/5 for left shoulder flexion, abduction, EROT and IROT. However he continues to demonstrate limited endurance of the left shoulder muscle group compared to the right.      Goals:  Goal Identifier Pain   Goal Description Patient will report he is able to sleep without disturbance, dress w/o difficulty and lift items to 5# to overhead shelves w/o limit due to left shoulder pain >1/10 in 6-8 weeks   Target Date 01/10/21   Date Met  21   Progress:     Goal Identifier Mobility   Goal Description Patient will demo full left shoulder flexion AROM allowing nml dressing and the ability to complete tasks with the shoulder in overhead positions such as hanging clothes, cleaning windows etc. in 6-8 weeks   Target Date 01/10/21   Date Met  21   Progress:Great progress made. Patient will benefit from continuing his HEP with focus on restoring full active left shoulder flexion     Goal Identifier Strength   Goal Description Patient will demo MMT grade 5/5 strength in all left RC and scapular stabilizer muscles supporting the safe use of the shoulder in elevated positions with hand tools to 5 lbs 3 days per week in  6-8 weeks   Target Date 01/10/21   Date Met  21   Progress:Great progress made. See measurements above. Patient will need to continue his HEP focused on RC strength and scapular stability " in order to restore full left shoulder endurance           Progress Toward Goals:   Progress this reporting period: See above          Plan:  Discharge from therapy.    Discharge:    Reason for Discharge: Patient has made great progress towards all goals.    Equipment Issued: None    Discharge Plan: Patient to continue home program.

## 2021-05-28 ENCOUNTER — RECORDS - HEALTHEAST (OUTPATIENT)
Dept: ADMINISTRATIVE | Facility: CLINIC | Age: 41
End: 2021-05-28

## 2021-07-30 ENCOUNTER — MYC MEDICAL ADVICE (OUTPATIENT)
Dept: FAMILY MEDICINE | Facility: OTHER | Age: 41
End: 2021-07-30

## 2021-07-30 NOTE — TELEPHONE ENCOUNTER
Last Dermatology referral was sent by Dr. Francois for screening for skin cancer. Being patient does not have a PCP, do you recommend he be seen or would you be willing to send another referral?    Yesenia Hester CMA on 7/30/2021 at 11:53 AM

## 2021-09-25 ENCOUNTER — HEALTH MAINTENANCE LETTER (OUTPATIENT)
Age: 41
End: 2021-09-25

## 2022-01-15 ENCOUNTER — HEALTH MAINTENANCE LETTER (OUTPATIENT)
Age: 42
End: 2022-01-15

## 2023-01-07 ENCOUNTER — HEALTH MAINTENANCE LETTER (OUTPATIENT)
Age: 43
End: 2023-01-07

## 2023-04-22 ENCOUNTER — HEALTH MAINTENANCE LETTER (OUTPATIENT)
Age: 43
End: 2023-04-22

## (undated) DEVICE — PREP CHLORAPREP CLEAR 26ML APPLICATOR 260800

## (undated) DEVICE — SU VICRYL 0 CT-2 CR 8X18" J727D

## (undated) DEVICE — GLOVE PROTEXIS POWDER FREE SMT 7.5  2D72PT75X

## (undated) DEVICE — SU MONOCRYL 4-0 PC-3 18" UND Y845G

## (undated) DEVICE — SU VICRYL 3-0 CT-3 27" J327H

## (undated) DEVICE — PACK MAJOR LAPAROTOMY LF SBA15MLFCA

## (undated) DEVICE — GLOVE BIOGEL INDICATOR 7.5 LF 41675

## (undated) DEVICE — LIGHT HANDLES PLASTIC

## (undated) DEVICE — BLADE KNIFE SURG 15 371115

## (undated) DEVICE — DRSG STERI STRIP 1/2X4" R1547

## (undated) DEVICE — SU DERMABOND MINI DHVM12

## (undated) DEVICE — SLEEVE COMPRESSION SCD KNEE MED 74022

## (undated) DEVICE — SOL WATER 1500ML

## (undated) DEVICE — ESU GROUND PAD ADULT W/CORD E7507

## (undated) DEVICE — DRSG TEGADERM 4X4 3/4" 1626W

## (undated) RX ORDER — PROPOFOL 10 MG/ML
INJECTION, EMULSION INTRAVENOUS
Status: DISPENSED
Start: 2018-12-28

## (undated) RX ORDER — KETAMINE HYDROCHLORIDE 50 MG/ML
INJECTION, SOLUTION INTRAMUSCULAR; INTRAVENOUS
Status: DISPENSED
Start: 2018-12-28

## (undated) RX ORDER — FENTANYL CITRATE 50 UG/ML
INJECTION, SOLUTION INTRAMUSCULAR; INTRAVENOUS
Status: DISPENSED
Start: 2018-12-28

## (undated) RX ORDER — LIDOCAINE HYDROCHLORIDE 20 MG/ML
INJECTION, SOLUTION EPIDURAL; INFILTRATION; INTRACAUDAL; PERINEURAL
Status: DISPENSED
Start: 2018-12-28

## (undated) RX ORDER — CEFAZOLIN SODIUM 2 G/100ML
INJECTION, SOLUTION INTRAVENOUS
Status: DISPENSED
Start: 2018-12-28

## (undated) RX ORDER — OXYCODONE HYDROCHLORIDE 5 MG/1
TABLET ORAL
Status: DISPENSED
Start: 2018-12-28

## (undated) RX ORDER — KETOROLAC TROMETHAMINE 30 MG/ML
INJECTION, SOLUTION INTRAMUSCULAR; INTRAVENOUS
Status: DISPENSED
Start: 2018-12-28

## (undated) RX ORDER — LIDOCAINE HYDROCHLORIDE 10 MG/ML
INJECTION, SOLUTION EPIDURAL; INFILTRATION; INTRACAUDAL; PERINEURAL
Status: DISPENSED
Start: 2018-12-28

## (undated) RX ORDER — ONDANSETRON 2 MG/ML
INJECTION INTRAMUSCULAR; INTRAVENOUS
Status: DISPENSED
Start: 2018-12-28

## (undated) RX ORDER — DEXAMETHASONE SODIUM PHOSPHATE 4 MG/ML
INJECTION, SOLUTION INTRA-ARTICULAR; INTRALESIONAL; INTRAMUSCULAR; INTRAVENOUS; SOFT TISSUE
Status: DISPENSED
Start: 2018-12-28

## (undated) RX ORDER — HYDROMORPHONE HYDROCHLORIDE 1 MG/ML
INJECTION, SOLUTION INTRAMUSCULAR; INTRAVENOUS; SUBCUTANEOUS
Status: DISPENSED
Start: 2018-12-28

## (undated) RX ORDER — BUPIVACAINE HYDROCHLORIDE AND EPINEPHRINE 5; 5 MG/ML; UG/ML
INJECTION, SOLUTION EPIDURAL; INTRACAUDAL; PERINEURAL
Status: DISPENSED
Start: 2018-12-28